# Patient Record
Sex: FEMALE | Race: WHITE | NOT HISPANIC OR LATINO | Employment: OTHER | URBAN - METROPOLITAN AREA
[De-identification: names, ages, dates, MRNs, and addresses within clinical notes are randomized per-mention and may not be internally consistent; named-entity substitution may affect disease eponyms.]

---

## 2023-06-01 ENCOUNTER — HOSPITAL ENCOUNTER (OUTPATIENT)
Dept: DATA CONVERSION | Facility: HOSPITAL | Age: 73
Discharge: STILL ADMITTED OR ADMINISTRATIVE | End: 2023-06-01
Attending: INTERNAL MEDICINE | Admitting: INTERNAL MEDICINE

## 2023-06-01 DIAGNOSIS — I25.119 ATHEROSCLEROTIC HEART DISEASE OF NATIVE CORONARY ARTERY WITH UNSPECIFIED ANGINA PECTORIS (CMS-HCC): ICD-10-CM

## 2023-06-01 DIAGNOSIS — E78.5 HYPERLIPIDEMIA, UNSPECIFIED: ICD-10-CM

## 2023-06-01 DIAGNOSIS — Z79.84 LONG TERM (CURRENT) USE OF ORAL HYPOGLYCEMIC DRUGS: ICD-10-CM

## 2023-06-01 DIAGNOSIS — I11.0 HYPERTENSIVE HEART DISEASE WITH HEART FAILURE (MULTI): ICD-10-CM

## 2023-06-01 DIAGNOSIS — Z90.49 ACQUIRED ABSENCE OF OTHER SPECIFIED PARTS OF DIGESTIVE TRACT: ICD-10-CM

## 2023-06-01 DIAGNOSIS — G47.33 OBSTRUCTIVE SLEEP APNEA (ADULT) (PEDIATRIC): ICD-10-CM

## 2023-06-01 DIAGNOSIS — Z79.85 LONG-TERM (CURRENT) USE OF INJECTABLE NON-INSULIN ANTIDIABETIC DRUGS: ICD-10-CM

## 2023-06-01 DIAGNOSIS — I48.0 PAROXYSMAL ATRIAL FIBRILLATION (MULTI): ICD-10-CM

## 2023-06-01 DIAGNOSIS — I21.4 NON-ST ELEVATION (NSTEMI) MYOCARDIAL INFARCTION (MULTI): Primary | ICD-10-CM

## 2023-06-01 DIAGNOSIS — I50.32 CHRONIC DIASTOLIC (CONGESTIVE) HEART FAILURE (MULTI): ICD-10-CM

## 2023-06-01 DIAGNOSIS — Z90.710 ACQUIRED ABSENCE OF BOTH CERVIX AND UTERUS: ICD-10-CM

## 2023-06-01 DIAGNOSIS — E66.01 MORBID (SEVERE) OBESITY DUE TO EXCESS CALORIES (MULTI): ICD-10-CM

## 2023-06-01 DIAGNOSIS — E11.9 TYPE 2 DIABETES MELLITUS WITHOUT COMPLICATIONS (MULTI): ICD-10-CM

## 2023-06-01 LAB
ANION GAP SERPL CALCULATED.3IONS-SCNC: 14 MMOL/L (ref 0–19)
ANTICOAGULANT: ABNORMAL
ANTICOAGULANT: NORMAL
APTT PPP: 20 SEC (ref 22–32.5)
BASOPHILS # BLD AUTO: 0.06 K/UL (ref 0–0.22)
BASOPHILS NFR BLD AUTO: 0.6 % (ref 0–1)
BUN SERPL-MCNC: 19 MG/DL (ref 8–25)
BUN/CREAT SERPL: 21.1 RATIO (ref 8–21)
CALCIUM SERPL-MCNC: 8.7 MG/DL (ref 8.5–10.4)
CHLORIDE SERPL-SCNC: 102 MMOL/L (ref 97–107)
CO2 SERPL-SCNC: 22 MMOL/L (ref 24–31)
CREAT SERPL-MCNC: 0.9 MG/DL (ref 0.4–1.6)
DEPRECATED RDW RBC AUTO: 41.3 FL (ref 37–54)
DIFFERENTIAL METHOD BLD: ABNORMAL
EOSINOPHIL # BLD AUTO: 0.03 K/UL (ref 0–0.45)
EOSINOPHIL NFR BLD: 0.3 % (ref 0–3)
ERYTHROCYTE [DISTWIDTH] IN BLOOD BY AUTOMATED COUNT: 11.8 % (ref 11.7–15)
GFR SERPL CREATININE-BSD FRML MDRD: 68 ML/MIN/1.73 M2
GLUCOSE SERPL-MCNC: 135 MG/DL (ref 65–99)
HCT VFR BLD AUTO: 42.9 % (ref 36–44)
HGB BLD-MCNC: 14.7 GM/DL (ref 12–15)
HS TROPONIN T DELTA: 21 (ref 0–4)
HS TROPONIN T DELTA: ABNORMAL (ref 0–4)
IMM GRANULOCYTES # BLD AUTO: 0.03 K/UL (ref 0–0.1)
INR PPP: 1 (ref 0.86–1.16)
LYMPHOCYTES # BLD AUTO: 1.76 K/UL (ref 1.2–3.2)
LYMPHOCYTES NFR BLD MANUAL: 16.8 % (ref 20–40)
MCH RBC QN AUTO: 32.6 PG (ref 26–34)
MCHC RBC AUTO-ENTMCNC: 34.3 % (ref 31–37)
MCV RBC AUTO: 95.1 FL (ref 80–100)
MONOCYTES # BLD AUTO: 1.12 K/UL (ref 0–0.8)
MONOCYTES NFR BLD MANUAL: 10.7 % (ref 0–8)
NEUTROPHILS # BLD AUTO: 7.47 K/UL
NEUTROPHILS # BLD AUTO: 7.47 K/UL (ref 1.8–7.7)
NEUTROPHILS.IMMATURE NFR BLD: 0.3 % (ref 0–1)
NEUTS SEG NFR BLD: 71.3 % (ref 50–70)
NRBC BLD-RTO: 0 /100 WBC
NT-PROBNP SERPL-MCNC: 2368 PG/ML (ref 0–353)
PLATELET # BLD AUTO: 319 K/UL (ref 150–450)
PMV BLD AUTO: 10.3 CU (ref 7–12.6)
POTASSIUM SERPL-SCNC: 4.1 MMOL/L (ref 3.4–5.1)
PROTHROMBIN TIME: 10.5 SEC (ref 9.3–12.7)
RBC # BLD AUTO: 4.51 M/UL (ref 4–4.9)
SODIUM SERPL-SCNC: 138 MMOL/L (ref 133–145)
TROPONIN T SERPL-MCNC: 865 NG/L
TROPONIN T SERPL-MCNC: 886 NG/L
WBC # BLD AUTO: 10.5 K/UL (ref 4.5–11)

## 2023-06-02 LAB
ALBUMIN SERPL-MCNC: 3.4 GM/DL (ref 3.5–5)
ALBUMIN/GLOB SERPL: 1.1 RATIO (ref 1.5–3)
ALP BLD-CCNC: 69 U/L (ref 35–125)
ALT SERPL-CCNC: 16 U/L (ref 5–40)
ANION GAP SERPL CALCULATED.3IONS-SCNC: 14 MMOL/L (ref 0–19)
ANION GAP SERPL CALCULATED.3IONS-SCNC: 16 MMOL/L (ref 0–19)
ANTICOAGULANT: ABNORMAL
APPEARANCE PLAS: CLEAR
APTT PPP: 139 SEC (ref 22–32.5)
APTT PPP: 36.8 SEC (ref 22–32.5)
APTT PPP: 44.1 SEC (ref 22–32.5)
AST SERPL-CCNC: 14 U/L (ref 5–40)
BASOPHILS # BLD AUTO: 0.04 K/UL (ref 0–0.22)
BASOPHILS NFR BLD AUTO: 0.4 % (ref 0–1)
BILIRUB SERPL-MCNC: 0.5 MG/DL (ref 0.1–1.2)
BUN SERPL-MCNC: 13 MG/DL (ref 8–25)
BUN SERPL-MCNC: 18 MG/DL (ref 8–25)
BUN/CREAT SERPL: 21.7 RATIO (ref 8–21)
BUN/CREAT SERPL: 25.7 RATIO (ref 8–21)
CALCIUM SERPL-MCNC: 8.6 MG/DL (ref 8.5–10.4)
CALCIUM SERPL-MCNC: 8.8 MG/DL (ref 8.5–10.4)
CHLORIDE SERPL-SCNC: 100 MMOL/L (ref 97–107)
CHLORIDE SERPL-SCNC: 103 MMOL/L (ref 97–107)
CHOLEST SERPL-MCNC: 161 MG/DL (ref 133–200)
CHOLEST/HDLC SERPL: 3.6 RATIO
CO2 SERPL-SCNC: 19 MMOL/L (ref 24–31)
CO2 SERPL-SCNC: 22 MMOL/L (ref 24–31)
COLOR SPUN FLD: YELLOW
CREAT SERPL-MCNC: 0.6 MG/DL (ref 0.4–1.6)
CREAT SERPL-MCNC: 0.7 MG/DL (ref 0.4–1.6)
DEPRECATED RDW RBC AUTO: 41.1 FL (ref 37–54)
DEPRECATED RDW RBC AUTO: 41.3 FL (ref 37–54)
DIFFERENTIAL METHOD BLD: ABNORMAL
EOSINOPHIL # BLD AUTO: 0.05 K/UL (ref 0–0.45)
EOSINOPHIL NFR BLD: 0.5 % (ref 0–3)
ERYTHROCYTE [DISTWIDTH] IN BLOOD BY AUTOMATED COUNT: 11.9 % (ref 11.7–15)
ERYTHROCYTE [DISTWIDTH] IN BLOOD BY AUTOMATED COUNT: 11.9 % (ref 11.7–15)
FASTING STATUS PATIENT QL REPORTED: ABNORMAL
GFR SERPL CREATININE-BSD FRML MDRD: 92 ML/MIN/1.73 M2
GFR SERPL CREATININE-BSD FRML MDRD: 95 ML/MIN/1.73 M2
GLOBULIN SER-MCNC: 3 G/DL (ref 1.9–3.7)
GLUCOSE BLD STRIP.AUTO-MCNC: 132 MG/DL (ref 65–99)
GLUCOSE BLD STRIP.AUTO-MCNC: 140 MG/DL (ref 65–99)
GLUCOSE BLD STRIP.AUTO-MCNC: 145 MG/DL (ref 65–99)
GLUCOSE BLD STRIP.AUTO-MCNC: 154 MG/DL (ref 65–99)
GLUCOSE SERPL-MCNC: 112 MG/DL (ref 65–99)
GLUCOSE SERPL-MCNC: 144 MG/DL (ref 65–99)
HBA1C MFR BLD: 5.9 % (ref 4–6)
HCT VFR BLD AUTO: 38 % (ref 36–44)
HCT VFR BLD AUTO: 41.3 % (ref 36–44)
HDLC SERPL-MCNC: 45 MG/DL
HGB BLD-MCNC: 13 GM/DL (ref 12–15)
HGB BLD-MCNC: 14.1 GM/DL (ref 12–15)
HS TROPONIN T DELTA: 92 (ref 0–4)
IMM GRANULOCYTES # BLD AUTO: 0.03 K/UL (ref 0–0.1)
LDLC SERPL CALC-MCNC: 82 MG/DL (ref 65–130)
LYMPHOCYTES # BLD AUTO: 2.15 K/UL (ref 1.2–3.2)
LYMPHOCYTES NFR BLD MANUAL: 23.5 % (ref 20–40)
MCH RBC QN AUTO: 32.3 PG (ref 26–34)
MCH RBC QN AUTO: 32.8 PG (ref 26–34)
MCHC RBC AUTO-ENTMCNC: 34.1 % (ref 31–37)
MCHC RBC AUTO-ENTMCNC: 34.2 % (ref 31–37)
MCV RBC AUTO: 94.3 FL (ref 80–100)
MCV RBC AUTO: 96 FL (ref 80–100)
MONOCYTES # BLD AUTO: 0.95 K/UL (ref 0–0.8)
MONOCYTES NFR BLD MANUAL: 10.4 % (ref 0–8)
NEUTROPHILS # BLD AUTO: 5.92 K/UL
NEUTROPHILS # BLD AUTO: 5.92 K/UL (ref 1.8–7.7)
NEUTROPHILS.IMMATURE NFR BLD: 0.3 % (ref 0–1)
NEUTS SEG NFR BLD: 64.9 % (ref 50–70)
NRBC BLD-RTO: 0 /100 WBC
NRBC BLD-RTO: 0 /100 WBC
PLATELET # BLD AUTO: 300 K/UL (ref 150–450)
PLATELET # BLD AUTO: 311 K/UL (ref 150–450)
PMV BLD AUTO: 10.3 CU (ref 7–12.6)
PMV BLD AUTO: 9.6 CU (ref 7–12.6)
POTASSIUM SERPL-SCNC: 3.9 MMOL/L (ref 3.4–5.1)
POTASSIUM SERPL-SCNC: 4 MMOL/L (ref 3.4–5.1)
PROT SERPL-MCNC: 6.4 G/DL (ref 5.9–7.9)
RBC # BLD AUTO: 4.03 M/UL (ref 4–4.9)
RBC # BLD AUTO: 4.3 M/UL (ref 4–4.9)
SODIUM SERPL-SCNC: 135 MMOL/L (ref 133–145)
SODIUM SERPL-SCNC: 139 MMOL/L (ref 133–145)
TRIGL SERPL-MCNC: 171 MG/DL (ref 40–150)
TROPONIN T SERPL-MCNC: 773 NG/L
WBC # BLD AUTO: 8.8 K/UL (ref 4.5–11)
WBC # BLD AUTO: 9.1 K/UL (ref 4.5–11)

## 2023-06-03 LAB
ANION GAP SERPL CALCULATED.3IONS-SCNC: 14 MMOL/L (ref 0–19)
ANTICOAGULANT: NORMAL
APPEARANCE PLAS: CLEAR
APTT PPP: 23.9 SEC (ref 22–32.5)
BASOPHILS # BLD AUTO: 0.03 K/UL (ref 0–0.22)
BASOPHILS NFR BLD AUTO: 0.3 % (ref 0–1)
BUN SERPL-MCNC: 15 MG/DL (ref 8–25)
BUN/CREAT SERPL: 21.4 RATIO (ref 8–21)
CALCIUM SERPL-MCNC: 8.7 MG/DL (ref 8.5–10.4)
CHLORIDE SERPL-SCNC: 102 MMOL/L (ref 97–107)
CHOLEST SERPL-MCNC: 125 MG/DL (ref 133–200)
CHOLEST/HDLC SERPL: 3.1 RATIO
CO2 SERPL-SCNC: 21 MMOL/L (ref 24–31)
COLOR SPUN FLD: YELLOW
CREAT SERPL-MCNC: 0.7 MG/DL (ref 0.4–1.6)
DEPRECATED RDW RBC AUTO: 40.8 FL (ref 37–54)
DIFFERENTIAL METHOD BLD: ABNORMAL
EOSINOPHIL # BLD AUTO: 0.11 K/UL (ref 0–0.45)
EOSINOPHIL NFR BLD: 1.2 % (ref 0–3)
ERYTHROCYTE [DISTWIDTH] IN BLOOD BY AUTOMATED COUNT: 11.8 % (ref 11.7–15)
FASTING STATUS PATIENT QL REPORTED: ABNORMAL
GFR SERPL CREATININE-BSD FRML MDRD: 92 ML/MIN/1.73 M2
GLUCOSE BLD STRIP.AUTO-MCNC: 149 MG/DL (ref 65–99)
GLUCOSE BLD STRIP.AUTO-MCNC: 163 MG/DL (ref 65–99)
GLUCOSE BLD STRIP.AUTO-MCNC: 322 MG/DL (ref 65–99)
GLUCOSE BLD STRIP.AUTO-MCNC: 79 MG/DL (ref 65–99)
GLUCOSE SERPL-MCNC: 181 MG/DL (ref 65–99)
HCT VFR BLD AUTO: 40.2 % (ref 36–44)
HDLC SERPL-MCNC: 40 MG/DL
HGB BLD-MCNC: 14.1 GM/DL (ref 12–15)
IMM GRANULOCYTES # BLD AUTO: 0.04 K/UL (ref 0–0.1)
LDLC SERPL CALC-MCNC: 55 MG/DL (ref 65–130)
LYMPHOCYTES # BLD AUTO: 1.65 K/UL (ref 1.2–3.2)
LYMPHOCYTES NFR BLD MANUAL: 18.7 % (ref 20–40)
MCH RBC QN AUTO: 33.5 PG (ref 26–34)
MCHC RBC AUTO-ENTMCNC: 35.1 % (ref 31–37)
MCV RBC AUTO: 95.5 FL (ref 80–100)
MONOCYTES # BLD AUTO: 0.8 K/UL (ref 0–0.8)
MONOCYTES NFR BLD MANUAL: 9.1 % (ref 0–8)
NEUTROPHILS # BLD AUTO: 6.18 K/UL
NEUTROPHILS # BLD AUTO: 6.18 K/UL (ref 1.8–7.7)
NEUTROPHILS.IMMATURE NFR BLD: 0.5 % (ref 0–1)
NEUTS SEG NFR BLD: 70.2 % (ref 50–70)
NRBC BLD-RTO: 0 /100 WBC
PLATELET # BLD AUTO: 314 K/UL (ref 150–450)
PMV BLD AUTO: 9.9 CU (ref 7–12.6)
POTASSIUM SERPL-SCNC: 3.8 MMOL/L (ref 3.4–5.1)
RBC # BLD AUTO: 4.21 M/UL (ref 4–4.9)
SODIUM SERPL-SCNC: 137 MMOL/L (ref 133–145)
TRIGL SERPL-MCNC: 149 MG/DL (ref 40–150)
WBC # BLD AUTO: 8.8 K/UL (ref 4.5–11)

## 2023-06-04 LAB
ANION GAP SERPL CALCULATED.3IONS-SCNC: 12 MMOL/L (ref 0–19)
BUN SERPL-MCNC: 17 MG/DL (ref 8–25)
BUN/CREAT SERPL: 24.3 RATIO (ref 8–21)
CALCIUM SERPL-MCNC: 8.7 MG/DL (ref 8.5–10.4)
CHLORIDE SERPL-SCNC: 105 MMOL/L (ref 97–107)
CO2 SERPL-SCNC: 22 MMOL/L (ref 24–31)
CREAT SERPL-MCNC: 0.7 MG/DL (ref 0.4–1.6)
DEPRECATED RDW RBC AUTO: 40.5 FL (ref 37–54)
ERYTHROCYTE [DISTWIDTH] IN BLOOD BY AUTOMATED COUNT: 11.7 % (ref 11.7–15)
GFR SERPL CREATININE-BSD FRML MDRD: 92 ML/MIN/1.73 M2
GLUCOSE BLD STRIP.AUTO-MCNC: 171 MG/DL (ref 65–99)
GLUCOSE SERPL-MCNC: 163 MG/DL (ref 65–99)
HCT VFR BLD AUTO: 39.6 % (ref 36–44)
HGB BLD-MCNC: 13.4 GM/DL (ref 12–15)
MCH RBC QN AUTO: 32.5 PG (ref 26–34)
MCHC RBC AUTO-ENTMCNC: 33.8 % (ref 31–37)
MCV RBC AUTO: 96.1 FL (ref 80–100)
NRBC BLD-RTO: 0 /100 WBC
PLATELET # BLD AUTO: 288 K/UL (ref 150–450)
PMV BLD AUTO: 10.1 CU (ref 7–12.6)
POTASSIUM SERPL-SCNC: 4.2 MMOL/L (ref 3.4–5.1)
RBC # BLD AUTO: 4.12 M/UL (ref 4–4.9)
SODIUM SERPL-SCNC: 139 MMOL/L (ref 133–145)
WBC # BLD AUTO: 10.9 K/UL (ref 4.5–11)

## 2023-06-13 LAB — TSI ACT/NOR SER: NORMAL

## 2023-09-06 ENCOUNTER — HOSPITAL ENCOUNTER (OUTPATIENT)
Dept: DATA CONVERSION | Facility: HOSPITAL | Age: 73
Discharge: HOME | End: 2023-09-06
Payer: COMMERCIAL

## 2023-09-06 DIAGNOSIS — E11.49 TYPE 2 DIABETES MELLITUS WITH OTHER DIABETIC NEUROLOGICAL COMPLICATION (MULTI): ICD-10-CM

## 2023-09-06 DIAGNOSIS — E11.40 TYPE 2 DIABETES MELLITUS WITH DIABETIC NEUROPATHY, UNSPECIFIED (MULTI): ICD-10-CM

## 2023-09-06 LAB
ALBUMIN SERPL-MCNC: 3.7 GM/DL (ref 3.5–5)
ALBUMIN/GLOB SERPL: 1.4 RATIO (ref 1.5–3)
ALP BLD-CCNC: 65 U/L (ref 35–125)
ALT SERPL-CCNC: 18 U/L (ref 5–40)
ANION GAP SERPL CALCULATED.3IONS-SCNC: 14 MMOL/L (ref 0–19)
AST SERPL-CCNC: 14 U/L (ref 5–40)
BACTERIA UR QL AUTO: POSITIVE
BILIRUB SERPL-MCNC: 0.5 MG/DL (ref 0.1–1.2)
BILIRUB UR QL STRIP.AUTO: NEGATIVE
BUN SERPL-MCNC: 13 MG/DL (ref 8–25)
BUN/CREAT SERPL: 18.6 RATIO (ref 8–21)
CALCIUM SERPL-MCNC: 9.1 MG/DL (ref 8.5–10.4)
CHLORIDE SERPL-SCNC: 103 MMOL/L (ref 97–107)
CLARITY UR: ABNORMAL
CO2 SERPL-SCNC: 23 MMOL/L (ref 24–31)
COLOR UR: COLORLESS
CREAT SERPL-MCNC: 0.7 MG/DL (ref 0.4–1.6)
GFR SERPL CREATININE-BSD FRML MDRD: 92 ML/MIN/1.73 M2
GLOBULIN SER-MCNC: 2.6 G/DL (ref 1.9–3.7)
GLUCOSE SERPL-MCNC: 216 MG/DL (ref 65–99)
GLUCOSE UR STRIP.AUTO-MCNC: NEGATIVE MG/DL
HBA1C MFR BLD: 7.1 % (ref 4–6)
HGB UR QL STRIP.AUTO: 6 /HPF (ref 0–3)
HGB UR QL: ABNORMAL
HYALINE CASTS UR QL AUTO: ABNORMAL /LPF
KETONES UR QL STRIP.AUTO: NEGATIVE
LEUKOCYTE ESTERASE UR QL STRIP.AUTO: ABNORMAL
NITRITE UR QL STRIP.AUTO: POSITIVE
PH UR STRIP.AUTO: 5.5 [PH] (ref 4.6–8)
POTASSIUM SERPL-SCNC: 4.1 MMOL/L (ref 3.4–5.1)
PROT SERPL-MCNC: 6.3 G/DL (ref 5.9–7.9)
PROT UR STRIP.AUTO-MCNC: NEGATIVE MG/DL
REFLEX MICROSCOPIC (UA): ABNORMAL
SODIUM SERPL-SCNC: 140 MMOL/L (ref 133–145)
SP GR UR STRIP.AUTO: 1.01 (ref 1–1.03)
SQUAMOUS UR QL AUTO: ABNORMAL /HPF
UNSPECIFIED CRY UR QL COMP ASSIST: ABNORMAL
UROBILINOGEN UR QL STRIP.AUTO: NORMAL MG/DL (ref 0–1)
WBC #/AREA URNS AUTO: 8 /HPF (ref 0–3)

## 2023-09-07 ENCOUNTER — HOSPITAL ENCOUNTER (OUTPATIENT)
Dept: DATA CONVERSION | Facility: HOSPITAL | Age: 73
Discharge: HOME | End: 2023-09-07
Payer: COMMERCIAL

## 2023-09-07 DIAGNOSIS — R53.83 OTHER FATIGUE: ICD-10-CM

## 2023-09-07 DIAGNOSIS — Z79.01 LONG TERM (CURRENT) USE OF ANTICOAGULANTS: ICD-10-CM

## 2023-09-07 DIAGNOSIS — E11.65 TYPE 2 DIABETES MELLITUS WITH HYPERGLYCEMIA (MULTI): ICD-10-CM

## 2023-09-07 DIAGNOSIS — Z79.899 OTHER LONG TERM (CURRENT) DRUG THERAPY: ICD-10-CM

## 2023-09-07 DIAGNOSIS — R00.0 TACHYCARDIA, UNSPECIFIED: ICD-10-CM

## 2023-09-07 DIAGNOSIS — Z91.148 PATIENT'S OTHER NONCOMPLIANCE WITH MEDICATION REGIMEN FOR OTHER REASON: ICD-10-CM

## 2023-09-07 DIAGNOSIS — I48.91 UNSPECIFIED ATRIAL FIBRILLATION (MULTI): ICD-10-CM

## 2023-09-07 LAB
ANION GAP SERPL CALCULATED.3IONS-SCNC: 15 MMOL/L (ref 0–19)
BASOPHILS # BLD AUTO: 0.04 K/UL (ref 0–0.22)
BASOPHILS NFR BLD AUTO: 0.5 % (ref 0–1)
BUN SERPL-MCNC: 11 MG/DL (ref 8–25)
BUN/CREAT SERPL: 18.3 RATIO (ref 8–21)
CALCIUM SERPL-MCNC: 9 MG/DL (ref 8.5–10.4)
CHLORIDE SERPL-SCNC: 102 MMOL/L (ref 97–107)
CO2 SERPL-SCNC: 21 MMOL/L (ref 24–31)
CREAT SERPL-MCNC: 0.6 MG/DL (ref 0.4–1.6)
DEPRECATED RDW RBC AUTO: 43.7 FL (ref 37–54)
DIFFERENTIAL METHOD BLD: NORMAL
EOSINOPHIL # BLD AUTO: 0.08 K/UL (ref 0–0.45)
EOSINOPHIL NFR BLD: 1 % (ref 0–3)
ERYTHROCYTE [DISTWIDTH] IN BLOOD BY AUTOMATED COUNT: 12.9 % (ref 11.7–15)
GFR SERPL CREATININE-BSD FRML MDRD: 95 ML/MIN/1.73 M2
GLUCOSE SERPL-MCNC: 335 MG/DL (ref 65–99)
HCT VFR BLD AUTO: 41.8 % (ref 36–44)
HGB BLD-MCNC: 14.3 GM/DL (ref 12–15)
HS TROPONIN T DELTA: 1 (ref 0–4)
HS TROPONIN T DELTA: NORMAL (ref 0–4)
IMM GRANULOCYTES # BLD AUTO: 0.03 K/UL (ref 0–0.1)
LYMPHOCYTES # BLD AUTO: 1.78 K/UL (ref 1.2–3.2)
LYMPHOCYTES NFR BLD MANUAL: 22.8 % (ref 20–40)
MCH RBC QN AUTO: 31.8 PG (ref 26–34)
MCHC RBC AUTO-ENTMCNC: 34.2 % (ref 31–37)
MCV RBC AUTO: 92.9 FL (ref 80–100)
MONOCYTES # BLD AUTO: 0.62 K/UL (ref 0–0.8)
MONOCYTES NFR BLD MANUAL: 7.9 % (ref 0–8)
NEUTROPHILS # BLD AUTO: 5.27 K/UL
NEUTROPHILS # BLD AUTO: 5.27 K/UL (ref 1.8–7.7)
NEUTROPHILS.IMMATURE NFR BLD: 0.4 % (ref 0–1)
NEUTS SEG NFR BLD: 67.4 % (ref 50–70)
NRBC BLD-RTO: 0 /100 WBC
PLATELET # BLD AUTO: 268 K/UL (ref 150–450)
PMV BLD AUTO: 9.9 CU (ref 7–12.6)
POTASSIUM SERPL-SCNC: 4.1 MMOL/L (ref 3.4–5.1)
RBC # BLD AUTO: 4.5 M/UL (ref 4–4.9)
SODIUM SERPL-SCNC: 138 MMOL/L (ref 133–145)
TROPONIN T SERPL-MCNC: 10 NG/L
TROPONIN T SERPL-MCNC: 11 NG/L
WBC # BLD AUTO: 7.8 K/UL (ref 4.5–11)

## 2023-09-09 PROBLEM — N39.0 UTI (URINARY TRACT INFECTION): Status: ACTIVE | Noted: 2023-07-25

## 2023-09-09 PROBLEM — R53.83 FATIGUE: Status: ACTIVE | Noted: 2023-06-01

## 2023-09-09 PROBLEM — F33.9 RECURRENT MAJOR DEPRESSIVE DISORDER (CMS-HCC): Status: ACTIVE | Noted: 2018-08-04

## 2023-09-09 PROBLEM — K52.9 ACUTE GASTROENTERITIS: Status: ACTIVE | Noted: 2019-04-10

## 2023-09-09 PROBLEM — J31.0 RHINITIS: Status: ACTIVE | Noted: 2020-03-20

## 2023-09-09 PROBLEM — H91.93 BILATERAL HEARING LOSS: Status: ACTIVE | Noted: 2020-11-04

## 2023-09-09 PROBLEM — E11.65 TYPE 2 DIABETES MELLITUS WITH HYPERGLYCEMIA (MULTI): Status: ACTIVE | Noted: 2018-08-02

## 2023-09-09 PROBLEM — N30.01 ACUTE CYSTITIS WITH HEMATURIA: Status: ACTIVE | Noted: 2020-03-11

## 2023-09-09 PROBLEM — E11.40 DIABETIC NEUROPATHY (MULTI): Status: ACTIVE | Noted: 2020-04-16

## 2023-09-09 PROBLEM — K21.9 GASTROESOPHAGEAL REFLUX DISEASE: Status: ACTIVE | Noted: 2019-05-24

## 2023-09-09 PROBLEM — G47.00 INSOMNIA: Status: ACTIVE | Noted: 2023-04-10

## 2023-09-09 PROBLEM — J00 NASOPHARYNGITIS: Status: ACTIVE | Noted: 2020-03-11

## 2023-09-09 PROBLEM — F41.9 ANXIETY: Status: ACTIVE | Noted: 2018-09-28

## 2023-09-09 PROBLEM — D17.0 LIPOMA OF NECK: Status: ACTIVE | Noted: 2023-09-09

## 2023-09-09 PROBLEM — H91.90 HEARING LOSS: Status: ACTIVE | Noted: 2020-11-02

## 2023-09-09 PROBLEM — D17.9 LIPOMA: Status: ACTIVE | Noted: 2019-07-31

## 2023-09-09 PROBLEM — G25.81 RESTLESS LEGS: Status: ACTIVE | Noted: 2019-05-24

## 2023-09-09 PROBLEM — I10 ESSENTIAL (PRIMARY) HYPERTENSION: Status: ACTIVE | Noted: 2018-09-29

## 2023-09-09 PROBLEM — L40.9 SCALP PSORIASIS: Status: ACTIVE | Noted: 2020-11-02

## 2023-09-09 PROBLEM — I20.9 ANGINA PECTORIS (CMS-HCC): Status: ACTIVE | Noted: 2023-09-09

## 2023-09-09 PROBLEM — R05.9 COUGH: Status: ACTIVE | Noted: 2020-03-11

## 2023-09-09 PROBLEM — I20.89 ANGINAL EQUIVALENT (CMS-HCC): Status: ACTIVE | Noted: 2023-09-09

## 2023-09-09 PROBLEM — I25.10 ASHD (ARTERIOSCLEROTIC HEART DISEASE): Status: ACTIVE | Noted: 2023-09-09

## 2023-09-09 PROBLEM — R58 ECCHYMOSIS: Status: ACTIVE | Noted: 2023-07-25

## 2023-09-09 PROBLEM — E66.9 OBESITY: Status: ACTIVE | Noted: 2019-02-25

## 2023-09-09 PROBLEM — Z91.148 NONCOMPLIANCE WITH MEDICATION REGIMEN: Status: ACTIVE | Noted: 2020-03-11

## 2023-09-09 PROBLEM — R11.2 NAUSEA & VOMITING: Status: ACTIVE | Noted: 2019-04-10

## 2023-09-09 PROBLEM — R07.9 CHEST PAIN: Status: ACTIVE | Noted: 2023-06-01

## 2023-09-09 PROBLEM — B37.31 YEAST VAGINITIS: Status: ACTIVE | Noted: 2022-12-23

## 2023-09-09 PROBLEM — R06.02 SHORTNESS OF BREATH ON EXERTION: Status: ACTIVE | Noted: 2023-06-01

## 2023-09-09 PROBLEM — R82.90 ABNORMAL FINDING IN URINE: Status: ACTIVE | Noted: 2020-08-11

## 2023-09-09 PROBLEM — I25.10 CAD (CORONARY ARTERY DISEASE): Status: ACTIVE | Noted: 2023-06-30

## 2023-09-09 PROBLEM — H69.90 EUSTACHIAN TUBE DYSFUNCTION: Status: ACTIVE | Noted: 2020-03-20

## 2023-09-09 PROBLEM — E11.9 TYPE 2 DIABETES MELLITUS WITHOUT COMPLICATIONS (MULTI): Status: ACTIVE | Noted: 2018-09-29

## 2023-09-09 PROBLEM — K58.9 IBS (IRRITABLE BOWEL SYNDROME): Status: ACTIVE | Noted: 2022-12-23

## 2023-09-09 PROBLEM — I48.91 ATRIAL FIBRILLATION WITH RVR (MULTI): Status: ACTIVE | Noted: 2023-06-14

## 2023-09-09 PROBLEM — R31.9 HEMATURIA: Status: ACTIVE | Noted: 2020-05-11

## 2023-09-09 RX ORDER — ROSUVASTATIN CALCIUM 20 MG/1
20 TABLET, COATED ORAL DAILY
COMMUNITY
Start: 2023-06-06 | End: 2024-01-03 | Stop reason: WASHOUT

## 2023-09-09 RX ORDER — LISINOPRIL 20 MG/1
20 TABLET ORAL DAILY
COMMUNITY
Start: 2023-03-16 | End: 2024-03-14

## 2023-09-09 RX ORDER — METFORMIN HYDROCHLORIDE 1000 MG/1
1000 TABLET ORAL 2 TIMES DAILY
COMMUNITY
Start: 2022-12-23 | End: 2023-11-30

## 2023-09-09 RX ORDER — APIXABAN 5 MG/1
5 TABLET, FILM COATED ORAL 2 TIMES DAILY
COMMUNITY
Start: 2023-06-15 | End: 2024-01-03 | Stop reason: WASHOUT

## 2023-09-09 RX ORDER — PIOGLITAZONEHYDROCHLORIDE 30 MG/1
TABLET ORAL
COMMUNITY
Start: 2023-06-15 | End: 2024-01-03 | Stop reason: WASHOUT

## 2023-09-09 RX ORDER — CLOPIDOGREL BISULFATE 75 MG/1
75 TABLET ORAL DAILY
COMMUNITY
Start: 2023-06-15

## 2023-09-09 RX ORDER — TRAZODONE HYDROCHLORIDE 50 MG/1
0.5 TABLET ORAL NIGHTLY
COMMUNITY
Start: 2023-06-08 | End: 2024-01-03 | Stop reason: WASHOUT

## 2023-09-09 RX ORDER — DULAGLUTIDE 0.75 MG/.5ML
INJECTION, SOLUTION SUBCUTANEOUS
COMMUNITY
Start: 2023-02-27 | End: 2023-10-04 | Stop reason: SDUPTHER

## 2023-09-09 RX ORDER — HYDROXYZINE HYDROCHLORIDE 25 MG/1
25 TABLET, FILM COATED ORAL
COMMUNITY
Start: 2022-09-21 | End: 2024-01-03 | Stop reason: WASHOUT

## 2023-09-09 RX ORDER — METFORMIN HYDROCHLORIDE 500 MG/1
3 TABLET ORAL EVERY 24 HOURS
COMMUNITY
End: 2024-04-03 | Stop reason: ALTCHOICE

## 2023-09-09 RX ORDER — ROSUVASTATIN CALCIUM 10 MG/1
10 TABLET, COATED ORAL NIGHTLY
COMMUNITY
Start: 2023-06-04

## 2023-09-09 RX ORDER — FLUCONAZOLE 150 MG/1
150 TABLET ORAL ONCE
COMMUNITY
Start: 2022-12-23 | End: 2024-01-03 | Stop reason: WASHOUT

## 2023-09-09 RX ORDER — METOPROLOL TARTRATE 25 MG/1
TABLET, FILM COATED ORAL
COMMUNITY
Start: 2023-06-27

## 2023-09-09 RX ORDER — EXENATIDE 2 MG/.85ML
INJECTION, SUSPENSION, EXTENDED RELEASE SUBCUTANEOUS
COMMUNITY
End: 2024-01-03 | Stop reason: WASHOUT

## 2023-09-09 RX ORDER — LANOLIN ALCOHOL/MO/W.PET/CERES
1000 CREAM (GRAM) TOPICAL DAILY
COMMUNITY
End: 2024-01-03 | Stop reason: WASHOUT

## 2023-09-09 RX ORDER — OMEPRAZOLE 40 MG/1
40 CAPSULE, DELAYED RELEASE ORAL DAILY
COMMUNITY
Start: 2022-04-27 | End: 2024-01-03 | Stop reason: WASHOUT

## 2023-09-09 RX ORDER — BLOOD-GLUCOSE METER
EACH MISCELLANEOUS
COMMUNITY
Start: 2023-08-05 | End: 2024-02-01

## 2023-09-09 RX ORDER — DULAGLUTIDE 1.5 MG/.5ML
1.5 INJECTION, SOLUTION SUBCUTANEOUS
COMMUNITY
End: 2023-10-04 | Stop reason: DRUGHIGH

## 2023-09-09 RX ORDER — DULOXETIN HYDROCHLORIDE 60 MG/1
1 CAPSULE, DELAYED RELEASE ORAL DAILY
COMMUNITY
Start: 2023-03-06 | End: 2024-02-28

## 2023-09-09 RX ORDER — GLIMEPIRIDE 2 MG/1
4 TABLET ORAL 2 TIMES DAILY
COMMUNITY
Start: 2022-12-23 | End: 2024-01-03 | Stop reason: WASHOUT

## 2023-09-09 RX ORDER — DIAZEPAM 5 MG/1
TABLET ORAL
COMMUNITY
Start: 2022-09-19 | End: 2024-01-03 | Stop reason: WASHOUT

## 2023-09-09 RX ORDER — NITROGLYCERIN 0.4 MG/1
TABLET SUBLINGUAL
COMMUNITY
Start: 2023-06-15

## 2023-09-25 DIAGNOSIS — Z95.5 STATUS POST PRIMARY ANGIOPLASTY WITH CORONARY STENT: Primary | ICD-10-CM

## 2023-09-26 DIAGNOSIS — Z95.5 STENTED CORONARY ARTERY: ICD-10-CM

## 2023-09-26 DIAGNOSIS — I21.4 NON-ST ELEVATION MYOCARDIAL INFARCTION (NSTEMI) (MULTI): ICD-10-CM

## 2023-09-28 ENCOUNTER — HOSPITAL ENCOUNTER (OUTPATIENT)
Dept: DATA CONVERSION | Facility: HOSPITAL | Age: 73
Discharge: HOME | End: 2023-09-28
Payer: COMMERCIAL

## 2023-10-03 ENCOUNTER — CLINICAL SUPPORT (OUTPATIENT)
Dept: CARDIAC REHAB | Facility: CLINIC | Age: 73
End: 2023-10-03
Payer: COMMERCIAL

## 2023-10-03 ENCOUNTER — APPOINTMENT (OUTPATIENT)
Dept: CARDIAC REHAB | Facility: CLINIC | Age: 73
End: 2023-10-03
Payer: COMMERCIAL

## 2023-10-03 DIAGNOSIS — Z95.5 STENTED CORONARY ARTERY: ICD-10-CM

## 2023-10-03 DIAGNOSIS — I21.4 NON-ST ELEVATION MYOCARDIAL INFARCTION (NSTEMI) (MULTI): ICD-10-CM

## 2023-10-03 PROCEDURE — 93798 PHYS/QHP OP CAR RHAB W/ECG: CPT | Performed by: INTERNAL MEDICINE

## 2023-10-04 ENCOUNTER — OFFICE VISIT (OUTPATIENT)
Dept: PRIMARY CARE | Facility: CLINIC | Age: 73
End: 2023-10-04
Payer: COMMERCIAL

## 2023-10-04 ENCOUNTER — APPOINTMENT (OUTPATIENT)
Dept: PRIMARY CARE | Facility: CLINIC | Age: 73
End: 2023-10-04
Payer: COMMERCIAL

## 2023-10-04 ENCOUNTER — TRANSCRIBE ORDERS (OUTPATIENT)
Dept: CARDIAC REHAB | Facility: CLINIC | Age: 73
End: 2023-10-04

## 2023-10-04 VITALS
SYSTOLIC BLOOD PRESSURE: 126 MMHG | OXYGEN SATURATION: 98 % | DIASTOLIC BLOOD PRESSURE: 74 MMHG | HEART RATE: 81 BPM | BODY MASS INDEX: 34.48 KG/M2 | WEIGHT: 207.2 LBS

## 2023-10-04 DIAGNOSIS — E11.9 TYPE 2 DIABETES MELLITUS WITHOUT COMPLICATION, WITHOUT LONG-TERM CURRENT USE OF INSULIN (MULTI): Primary | ICD-10-CM

## 2023-10-04 DIAGNOSIS — I21.4 NON-ST ELEVATION MYOCARDIAL INFARCTION (NSTEMI) (MULTI): Primary | ICD-10-CM

## 2023-10-04 DIAGNOSIS — Z95.5 STENTED CORONARY ARTERY: ICD-10-CM

## 2023-10-04 DIAGNOSIS — R39.9 URINARY TRACT INFECTION SYMPTOMS: ICD-10-CM

## 2023-10-04 PROCEDURE — 1159F MED LIST DOCD IN RCRD: CPT | Performed by: STUDENT IN AN ORGANIZED HEALTH CARE EDUCATION/TRAINING PROGRAM

## 2023-10-04 PROCEDURE — 3078F DIAST BP <80 MM HG: CPT | Performed by: STUDENT IN AN ORGANIZED HEALTH CARE EDUCATION/TRAINING PROGRAM

## 2023-10-04 PROCEDURE — 4010F ACE/ARB THERAPY RXD/TAKEN: CPT | Performed by: STUDENT IN AN ORGANIZED HEALTH CARE EDUCATION/TRAINING PROGRAM

## 2023-10-04 PROCEDURE — 99213 OFFICE O/P EST LOW 20 MIN: CPT | Performed by: STUDENT IN AN ORGANIZED HEALTH CARE EDUCATION/TRAINING PROGRAM

## 2023-10-04 PROCEDURE — 3008F BODY MASS INDEX DOCD: CPT | Performed by: STUDENT IN AN ORGANIZED HEALTH CARE EDUCATION/TRAINING PROGRAM

## 2023-10-04 PROCEDURE — 1160F RVW MEDS BY RX/DR IN RCRD: CPT | Performed by: STUDENT IN AN ORGANIZED HEALTH CARE EDUCATION/TRAINING PROGRAM

## 2023-10-04 PROCEDURE — 3051F HG A1C>EQUAL 7.0%<8.0%: CPT | Performed by: STUDENT IN AN ORGANIZED HEALTH CARE EDUCATION/TRAINING PROGRAM

## 2023-10-04 PROCEDURE — 1126F AMNT PAIN NOTED NONE PRSNT: CPT | Performed by: STUDENT IN AN ORGANIZED HEALTH CARE EDUCATION/TRAINING PROGRAM

## 2023-10-04 PROCEDURE — 1036F TOBACCO NON-USER: CPT | Performed by: STUDENT IN AN ORGANIZED HEALTH CARE EDUCATION/TRAINING PROGRAM

## 2023-10-04 PROCEDURE — 3074F SYST BP LT 130 MM HG: CPT | Performed by: STUDENT IN AN ORGANIZED HEALTH CARE EDUCATION/TRAINING PROGRAM

## 2023-10-04 RX ORDER — DULAGLUTIDE 0.75 MG/.5ML
INJECTION, SOLUTION SUBCUTANEOUS
Qty: 2 ML | Refills: 0 | Status: SHIPPED | OUTPATIENT
Start: 2023-10-04 | End: 2023-11-07 | Stop reason: DRUGHIGH

## 2023-10-04 ASSESSMENT — PAIN SCALES - GENERAL: PAINLEVEL: 0-NO PAIN

## 2023-10-04 ASSESSMENT — PATIENT HEALTH QUESTIONNAIRE - PHQ9
1. LITTLE INTEREST OR PLEASURE IN DOING THINGS: NOT AT ALL
SUM OF ALL RESPONSES TO PHQ9 QUESTIONS 1 AND 2: 0
2. FEELING DOWN, DEPRESSED OR HOPELESS: NOT AT ALL

## 2023-10-04 NOTE — PATIENT INSTRUCTIONS
Refill sent for Trulicity 0.75mg. When you take your third dose contact the office so I can send for the increased dose of 1.5mg  Reach out to me or Sybil with any issues getting filled  Follow up diabetic check 3 months

## 2023-10-04 NOTE — PROGRESS NOTES
"Subjective   Kristi Jackson is a 72 y.o. female who presents for Diabetes Follow up    HPI   Tolerating current medication regimen yes  Current medications  -Trulicity -STOPPED d/t issues getting refilled at pharmacy. Says she called office about it but never got response.  -Glimepiride 4mg BID  -Metformin 1000mg BID  -Pioglitazone 30mg  Blood sugars at home averaging 157-212  Hypoglycemic episodes no  Last A1C : 7.1, prior 5.9  Up to date on yearly eye exams yes  Follows with podiatry no    Currently in cardiac rehab. Following with Dr. Rodriguez    LAB REVIEW   Glucose (MG/DL)   Date Value   09/07/2023 335 (H)   09/06/2023 216 (H)   06/30/2023 129 (H)     Hemoglobin A1C (%)   Date Value   09/06/2023 7.1 (H)   06/29/2023 5.9   06/02/2023 5.9       Creatinine (MG/DL)   Date Value   09/07/2023 0.6   09/06/2023 0.7   06/30/2023 0.7     Lab Results   Component Value Date    ALBUR 12 06/29/2023    LDLCALC 55 (L) 06/03/2023    CREATININE 0.6 09/07/2023     Lab Results   Component Value Date    CHOL 125 (L) 06/03/2023    CHOL 161 06/02/2023    CHOL 218 (H) 06/03/2021     Lab Results   Component Value Date    HDL 40 (L) 06/03/2023    HDL 45 (L) 06/02/2023    HDL 60 06/03/2021     Lab Results   Component Value Date    LDLCALC 55 (L) 06/03/2023    LDLCALC 82 06/02/2023    LDLCALC 129 06/03/2021     Lab Results   Component Value Date    TRIG 149 06/03/2023    TRIG 171 (H) 06/02/2023    TRIG 144 06/03/2021     No components found for: \"CHOLHDL\"  Lab Results   Component Value Date    LDLCALC 55 (L) 06/03/2023       ROS otherwise negative aside from what was mentioned above in HPI.    Objective   /74   Pulse 81   Wt 94 kg (207 lb 3.2 oz)   SpO2 98%   BMI 34.48 kg/m²     Physical Exam  Constitutional:       Appearance: Normal appearance.   Cardiovascular:      Rate and Rhythm: Normal rate and regular rhythm.      Pulses: Normal pulses.      Heart sounds: Normal heart sounds. No murmur heard.  Pulmonary:      Effort: " Pulmonary effort is normal.      Breath sounds: Normal breath sounds. No wheezing, rhonchi or rales.   Neurological:      Mental Status: She is alert.         Assessment/Plan   Interval increase in A1c from prior but still close to goal of <7. Will restart on Trulicity. Will need to start back at 0.5mg dose as she has been off x 3 mos. Will titrate up. Apologized for the issues with prior refill attempt.

## 2023-10-05 ENCOUNTER — APPOINTMENT (OUTPATIENT)
Dept: CARDIAC REHAB | Facility: CLINIC | Age: 73
End: 2023-10-05
Payer: COMMERCIAL

## 2023-10-10 ENCOUNTER — APPOINTMENT (OUTPATIENT)
Dept: CARDIAC REHAB | Facility: CLINIC | Age: 73
End: 2023-10-10
Payer: COMMERCIAL

## 2023-10-10 ENCOUNTER — CLINICAL SUPPORT (OUTPATIENT)
Dept: CARDIAC REHAB | Facility: CLINIC | Age: 73
End: 2023-10-10
Payer: COMMERCIAL

## 2023-10-10 DIAGNOSIS — Z95.5 STENTED CORONARY ARTERY: ICD-10-CM

## 2023-10-10 DIAGNOSIS — I21.4 NON-ST ELEVATION MYOCARDIAL INFARCTION (NSTEMI) (MULTI): ICD-10-CM

## 2023-10-10 PROCEDURE — 93798 PHYS/QHP OP CAR RHAB W/ECG: CPT | Performed by: INTERNAL MEDICINE

## 2023-10-12 ENCOUNTER — APPOINTMENT (OUTPATIENT)
Dept: CARDIAC REHAB | Facility: CLINIC | Age: 73
End: 2023-10-12
Payer: COMMERCIAL

## 2023-10-12 ENCOUNTER — CLINICAL SUPPORT (OUTPATIENT)
Dept: CARDIAC REHAB | Facility: CLINIC | Age: 73
End: 2023-10-12
Payer: COMMERCIAL

## 2023-10-12 DIAGNOSIS — Z95.5 STENTED CORONARY ARTERY: ICD-10-CM

## 2023-10-12 DIAGNOSIS — I21.4 NON-ST ELEVATION MYOCARDIAL INFARCTION (NSTEMI) (MULTI): ICD-10-CM

## 2023-10-12 PROCEDURE — 93798 PHYS/QHP OP CAR RHAB W/ECG: CPT | Performed by: INTERNAL MEDICINE

## 2023-10-17 ENCOUNTER — APPOINTMENT (OUTPATIENT)
Dept: CARDIAC REHAB | Facility: CLINIC | Age: 73
End: 2023-10-17
Payer: COMMERCIAL

## 2023-10-17 ENCOUNTER — CLINICAL SUPPORT (OUTPATIENT)
Dept: CARDIAC REHAB | Facility: CLINIC | Age: 73
End: 2023-10-17
Payer: COMMERCIAL

## 2023-10-17 DIAGNOSIS — Z95.5 STENTED CORONARY ARTERY: ICD-10-CM

## 2023-10-17 DIAGNOSIS — I21.4 NON-ST ELEVATION MYOCARDIAL INFARCTION (NSTEMI) (MULTI): ICD-10-CM

## 2023-10-17 PROCEDURE — 93798 PHYS/QHP OP CAR RHAB W/ECG: CPT | Performed by: INTERNAL MEDICINE

## 2023-10-19 ENCOUNTER — CLINICAL SUPPORT (OUTPATIENT)
Dept: CARDIAC REHAB | Facility: CLINIC | Age: 73
End: 2023-10-19
Payer: COMMERCIAL

## 2023-10-19 ENCOUNTER — APPOINTMENT (OUTPATIENT)
Dept: CARDIAC REHAB | Facility: CLINIC | Age: 73
End: 2023-10-19
Payer: COMMERCIAL

## 2023-10-19 DIAGNOSIS — I21.4 NON-ST ELEVATION MYOCARDIAL INFARCTION (NSTEMI) (MULTI): ICD-10-CM

## 2023-10-19 DIAGNOSIS — Z95.5 STENTED CORONARY ARTERY: ICD-10-CM

## 2023-10-19 PROCEDURE — 93798 PHYS/QHP OP CAR RHAB W/ECG: CPT | Performed by: INTERNAL MEDICINE

## 2023-10-24 ENCOUNTER — CLINICAL SUPPORT (OUTPATIENT)
Dept: CARDIAC REHAB | Facility: CLINIC | Age: 73
End: 2023-10-24
Payer: COMMERCIAL

## 2023-10-24 ENCOUNTER — APPOINTMENT (OUTPATIENT)
Dept: CARDIAC REHAB | Facility: CLINIC | Age: 73
End: 2023-10-24
Payer: COMMERCIAL

## 2023-10-24 DIAGNOSIS — I21.4 NON-ST ELEVATION MYOCARDIAL INFARCTION (NSTEMI) (MULTI): ICD-10-CM

## 2023-10-24 DIAGNOSIS — Z95.5 STENTED CORONARY ARTERY: ICD-10-CM

## 2023-10-24 PROCEDURE — 93798 PHYS/QHP OP CAR RHAB W/ECG: CPT | Performed by: INTERNAL MEDICINE

## 2023-10-26 ENCOUNTER — APPOINTMENT (OUTPATIENT)
Dept: CARDIAC REHAB | Facility: CLINIC | Age: 73
End: 2023-10-26
Payer: COMMERCIAL

## 2023-10-26 ENCOUNTER — CLINICAL SUPPORT (OUTPATIENT)
Dept: CARDIAC REHAB | Facility: CLINIC | Age: 73
End: 2023-10-26
Payer: COMMERCIAL

## 2023-10-26 DIAGNOSIS — Z95.5 STENTED CORONARY ARTERY: ICD-10-CM

## 2023-10-26 DIAGNOSIS — I21.4 NON-ST ELEVATION MYOCARDIAL INFARCTION (NSTEMI) (MULTI): ICD-10-CM

## 2023-10-26 PROCEDURE — 93798 PHYS/QHP OP CAR RHAB W/ECG: CPT | Performed by: INTERNAL MEDICINE

## 2023-10-31 ENCOUNTER — APPOINTMENT (OUTPATIENT)
Dept: CARDIAC REHAB | Facility: CLINIC | Age: 73
End: 2023-10-31
Payer: COMMERCIAL

## 2023-10-31 ENCOUNTER — CLINICAL SUPPORT (OUTPATIENT)
Dept: CARDIAC REHAB | Facility: CLINIC | Age: 73
End: 2023-10-31
Payer: COMMERCIAL

## 2023-10-31 DIAGNOSIS — Z95.5 STENTED CORONARY ARTERY: ICD-10-CM

## 2023-10-31 DIAGNOSIS — I21.4 NON-ST ELEVATION MYOCARDIAL INFARCTION (NSTEMI) (MULTI): ICD-10-CM

## 2023-10-31 PROCEDURE — 93798 PHYS/QHP OP CAR RHAB W/ECG: CPT | Performed by: INTERNAL MEDICINE

## 2023-11-02 ENCOUNTER — CLINICAL SUPPORT (OUTPATIENT)
Dept: CARDIAC REHAB | Facility: CLINIC | Age: 73
End: 2023-11-02
Payer: COMMERCIAL

## 2023-11-02 ENCOUNTER — APPOINTMENT (OUTPATIENT)
Dept: CARDIAC REHAB | Facility: CLINIC | Age: 73
End: 2023-11-02
Payer: COMMERCIAL

## 2023-11-02 DIAGNOSIS — I21.4 NON-ST ELEVATION MYOCARDIAL INFARCTION (NSTEMI) (MULTI): ICD-10-CM

## 2023-11-02 DIAGNOSIS — Z95.5 STENTED CORONARY ARTERY: ICD-10-CM

## 2023-11-02 PROCEDURE — 93798 PHYS/QHP OP CAR RHAB W/ECG: CPT | Performed by: INTERNAL MEDICINE

## 2023-11-07 ENCOUNTER — PHARMACY VISIT (OUTPATIENT)
Dept: PHARMACY | Facility: CLINIC | Age: 73
End: 2023-11-07
Payer: COMMERCIAL

## 2023-11-07 ENCOUNTER — APPOINTMENT (OUTPATIENT)
Dept: CARDIAC REHAB | Facility: CLINIC | Age: 73
End: 2023-11-07
Payer: COMMERCIAL

## 2023-11-07 DIAGNOSIS — E11.9 TYPE 2 DIABETES MELLITUS WITHOUT COMPLICATION, WITHOUT LONG-TERM CURRENT USE OF INSULIN (MULTI): ICD-10-CM

## 2023-11-07 PROCEDURE — RXMED WILLOW AMBULATORY MEDICATION CHARGE

## 2023-11-07 RX ORDER — DULAGLUTIDE 1.5 MG/.5ML
1.5 INJECTION, SOLUTION SUBCUTANEOUS
Qty: 6 ML | Refills: 0 | Status: ON HOLD | OUTPATIENT
Start: 2023-11-07 | End: 2024-06-05 | Stop reason: ALTCHOICE

## 2023-11-09 ENCOUNTER — CLINICAL SUPPORT (OUTPATIENT)
Dept: CARDIAC REHAB | Facility: CLINIC | Age: 73
End: 2023-11-09
Payer: COMMERCIAL

## 2023-11-09 ENCOUNTER — APPOINTMENT (OUTPATIENT)
Dept: CARDIAC REHAB | Facility: CLINIC | Age: 73
End: 2023-11-09
Payer: COMMERCIAL

## 2023-11-09 DIAGNOSIS — Z95.5 STENTED CORONARY ARTERY: ICD-10-CM

## 2023-11-09 DIAGNOSIS — I21.4 NON-ST ELEVATION MYOCARDIAL INFARCTION (NSTEMI) (MULTI): ICD-10-CM

## 2023-11-09 PROCEDURE — 93798 PHYS/QHP OP CAR RHAB W/ECG: CPT | Performed by: INTERNAL MEDICINE

## 2023-11-14 ENCOUNTER — APPOINTMENT (OUTPATIENT)
Dept: CARDIAC REHAB | Facility: CLINIC | Age: 73
End: 2023-11-14
Payer: COMMERCIAL

## 2023-11-14 ENCOUNTER — CLINICAL SUPPORT (OUTPATIENT)
Dept: CARDIAC REHAB | Facility: CLINIC | Age: 73
End: 2023-11-14
Payer: COMMERCIAL

## 2023-11-14 DIAGNOSIS — I21.4 NON-ST ELEVATION MYOCARDIAL INFARCTION (NSTEMI) (MULTI): ICD-10-CM

## 2023-11-14 DIAGNOSIS — Z95.5 STENTED CORONARY ARTERY: ICD-10-CM

## 2023-11-14 PROCEDURE — 93798 PHYS/QHP OP CAR RHAB W/ECG: CPT | Performed by: INTERNAL MEDICINE

## 2023-11-16 ENCOUNTER — APPOINTMENT (OUTPATIENT)
Dept: CARDIAC REHAB | Facility: CLINIC | Age: 73
End: 2023-11-16
Payer: COMMERCIAL

## 2023-11-16 ENCOUNTER — CLINICAL SUPPORT (OUTPATIENT)
Dept: CARDIAC REHAB | Facility: CLINIC | Age: 73
End: 2023-11-16
Payer: COMMERCIAL

## 2023-11-16 DIAGNOSIS — Z95.5 STENTED CORONARY ARTERY: ICD-10-CM

## 2023-11-16 DIAGNOSIS — I21.4 NON-ST ELEVATION MYOCARDIAL INFARCTION (NSTEMI) (MULTI): ICD-10-CM

## 2023-11-16 PROCEDURE — 93798 PHYS/QHP OP CAR RHAB W/ECG: CPT | Performed by: INTERNAL MEDICINE

## 2023-11-21 ENCOUNTER — CLINICAL SUPPORT (OUTPATIENT)
Dept: CARDIAC REHAB | Facility: CLINIC | Age: 73
End: 2023-11-21
Payer: COMMERCIAL

## 2023-11-21 ENCOUNTER — APPOINTMENT (OUTPATIENT)
Dept: CARDIAC REHAB | Facility: CLINIC | Age: 73
End: 2023-11-21
Payer: COMMERCIAL

## 2023-11-21 DIAGNOSIS — Z95.5 STENTED CORONARY ARTERY: ICD-10-CM

## 2023-11-21 DIAGNOSIS — I21.4 NON-ST ELEVATION MYOCARDIAL INFARCTION (NSTEMI) (MULTI): ICD-10-CM

## 2023-11-28 ENCOUNTER — APPOINTMENT (OUTPATIENT)
Dept: CARDIAC REHAB | Facility: CLINIC | Age: 73
End: 2023-11-28
Payer: COMMERCIAL

## 2023-11-30 ENCOUNTER — CLINICAL SUPPORT (OUTPATIENT)
Dept: CARDIAC REHAB | Facility: CLINIC | Age: 73
End: 2023-11-30
Payer: COMMERCIAL

## 2023-11-30 ENCOUNTER — APPOINTMENT (OUTPATIENT)
Dept: CARDIAC REHAB | Facility: CLINIC | Age: 73
End: 2023-11-30
Payer: COMMERCIAL

## 2023-11-30 DIAGNOSIS — E11.9 TYPE 2 DIABETES MELLITUS WITHOUT COMPLICATION, WITHOUT LONG-TERM CURRENT USE OF INSULIN (MULTI): Primary | ICD-10-CM

## 2023-11-30 DIAGNOSIS — I21.4 NON-ST ELEVATION MYOCARDIAL INFARCTION (NSTEMI) (MULTI): ICD-10-CM

## 2023-11-30 DIAGNOSIS — Z95.5 STENTED CORONARY ARTERY: ICD-10-CM

## 2023-11-30 PROCEDURE — 93798 PHYS/QHP OP CAR RHAB W/ECG: CPT | Performed by: INTERNAL MEDICINE

## 2023-11-30 RX ORDER — METFORMIN HYDROCHLORIDE 1000 MG/1
1000 TABLET ORAL 2 TIMES DAILY
Qty: 180 TABLET | Refills: 3 | Status: SHIPPED | OUTPATIENT
Start: 2023-11-30

## 2023-12-05 ENCOUNTER — APPOINTMENT (OUTPATIENT)
Dept: CARDIAC REHAB | Facility: CLINIC | Age: 73
End: 2023-12-05
Payer: COMMERCIAL

## 2023-12-05 ENCOUNTER — CLINICAL SUPPORT (OUTPATIENT)
Dept: CARDIAC REHAB | Facility: CLINIC | Age: 73
End: 2023-12-05
Payer: COMMERCIAL

## 2023-12-05 DIAGNOSIS — Z95.5 STENTED CORONARY ARTERY: ICD-10-CM

## 2023-12-05 DIAGNOSIS — I21.4 NON-ST ELEVATION MYOCARDIAL INFARCTION (NSTEMI) (MULTI): ICD-10-CM

## 2023-12-05 PROCEDURE — 93798 PHYS/QHP OP CAR RHAB W/ECG: CPT | Performed by: INTERNAL MEDICINE

## 2023-12-07 ENCOUNTER — CLINICAL SUPPORT (OUTPATIENT)
Dept: CARDIAC REHAB | Facility: CLINIC | Age: 73
End: 2023-12-07
Payer: COMMERCIAL

## 2023-12-07 ENCOUNTER — APPOINTMENT (OUTPATIENT)
Dept: CARDIAC REHAB | Facility: CLINIC | Age: 73
End: 2023-12-07
Payer: COMMERCIAL

## 2023-12-07 DIAGNOSIS — I21.4 NON-ST ELEVATION MYOCARDIAL INFARCTION (NSTEMI) (MULTI): ICD-10-CM

## 2023-12-07 DIAGNOSIS — Z95.5 STENTED CORONARY ARTERY: ICD-10-CM

## 2023-12-07 PROCEDURE — 93798 PHYS/QHP OP CAR RHAB W/ECG: CPT | Performed by: INTERNAL MEDICINE

## 2023-12-09 ENCOUNTER — PHARMACY VISIT (OUTPATIENT)
Dept: PHARMACY | Facility: CLINIC | Age: 73
End: 2023-12-09
Payer: COMMERCIAL

## 2023-12-09 PROCEDURE — RXMED WILLOW AMBULATORY MEDICATION CHARGE

## 2023-12-12 ENCOUNTER — APPOINTMENT (OUTPATIENT)
Dept: CARDIAC REHAB | Facility: CLINIC | Age: 73
End: 2023-12-12
Payer: COMMERCIAL

## 2023-12-12 ENCOUNTER — CLINICAL SUPPORT (OUTPATIENT)
Dept: CARDIAC REHAB | Facility: CLINIC | Age: 73
End: 2023-12-12
Payer: COMMERCIAL

## 2023-12-12 DIAGNOSIS — Z95.5 STENTED CORONARY ARTERY: ICD-10-CM

## 2023-12-12 DIAGNOSIS — I21.4 NON-ST ELEVATION MYOCARDIAL INFARCTION (NSTEMI) (MULTI): ICD-10-CM

## 2023-12-12 PROCEDURE — 93798 PHYS/QHP OP CAR RHAB W/ECG: CPT | Performed by: INTERNAL MEDICINE

## 2023-12-14 ENCOUNTER — APPOINTMENT (OUTPATIENT)
Dept: CARDIAC REHAB | Facility: CLINIC | Age: 73
End: 2023-12-14
Payer: COMMERCIAL

## 2023-12-14 ENCOUNTER — CLINICAL SUPPORT (OUTPATIENT)
Dept: CARDIAC REHAB | Facility: CLINIC | Age: 73
End: 2023-12-14
Payer: COMMERCIAL

## 2023-12-14 DIAGNOSIS — Z95.5 STENTED CORONARY ARTERY: ICD-10-CM

## 2023-12-14 DIAGNOSIS — I21.4 NON-ST ELEVATION MYOCARDIAL INFARCTION (NSTEMI) (MULTI): ICD-10-CM

## 2023-12-14 PROCEDURE — 93798 PHYS/QHP OP CAR RHAB W/ECG: CPT | Performed by: INTERNAL MEDICINE

## 2023-12-19 ENCOUNTER — APPOINTMENT (OUTPATIENT)
Dept: CARDIAC REHAB | Facility: CLINIC | Age: 73
End: 2023-12-19
Payer: COMMERCIAL

## 2023-12-19 ENCOUNTER — CLINICAL SUPPORT (OUTPATIENT)
Dept: CARDIAC REHAB | Facility: CLINIC | Age: 73
End: 2023-12-19
Payer: COMMERCIAL

## 2023-12-19 DIAGNOSIS — I21.4 NON-ST ELEVATION MYOCARDIAL INFARCTION (NSTEMI) (MULTI): ICD-10-CM

## 2023-12-19 DIAGNOSIS — Z95.5 STENTED CORONARY ARTERY: ICD-10-CM

## 2023-12-19 PROCEDURE — 93798 PHYS/QHP OP CAR RHAB W/ECG: CPT | Performed by: INTERNAL MEDICINE

## 2023-12-21 ENCOUNTER — CLINICAL SUPPORT (OUTPATIENT)
Dept: CARDIAC REHAB | Facility: CLINIC | Age: 73
End: 2023-12-21
Payer: COMMERCIAL

## 2023-12-21 ENCOUNTER — APPOINTMENT (OUTPATIENT)
Dept: CARDIAC REHAB | Facility: CLINIC | Age: 73
End: 2023-12-21
Payer: COMMERCIAL

## 2023-12-21 DIAGNOSIS — I21.4 NON-ST ELEVATION MYOCARDIAL INFARCTION (NSTEMI) (MULTI): ICD-10-CM

## 2023-12-21 DIAGNOSIS — Z95.5 STENTED CORONARY ARTERY: ICD-10-CM

## 2023-12-21 PROCEDURE — 93798 PHYS/QHP OP CAR RHAB W/ECG: CPT | Performed by: INTERNAL MEDICINE

## 2023-12-26 ENCOUNTER — APPOINTMENT (OUTPATIENT)
Dept: CARDIAC REHAB | Facility: CLINIC | Age: 73
End: 2023-12-26
Payer: COMMERCIAL

## 2023-12-28 ENCOUNTER — APPOINTMENT (OUTPATIENT)
Dept: CARDIAC REHAB | Facility: CLINIC | Age: 73
End: 2023-12-28
Payer: COMMERCIAL

## 2023-12-28 ENCOUNTER — CLINICAL SUPPORT (OUTPATIENT)
Dept: CARDIAC REHAB | Facility: CLINIC | Age: 73
End: 2023-12-28
Payer: COMMERCIAL

## 2023-12-28 DIAGNOSIS — Z95.5 STENTED CORONARY ARTERY: ICD-10-CM

## 2023-12-28 DIAGNOSIS — I21.4 NON-ST ELEVATION MYOCARDIAL INFARCTION (NSTEMI) (MULTI): ICD-10-CM

## 2023-12-29 ENCOUNTER — CLINICAL SUPPORT (OUTPATIENT)
Dept: CARDIAC REHAB | Facility: CLINIC | Age: 73
End: 2023-12-29
Payer: COMMERCIAL

## 2023-12-29 DIAGNOSIS — I21.4 NON-ST ELEVATION MYOCARDIAL INFARCTION (NSTEMI) (MULTI): ICD-10-CM

## 2023-12-29 DIAGNOSIS — Z95.5 STENTED CORONARY ARTERY: ICD-10-CM

## 2024-01-02 ENCOUNTER — APPOINTMENT (OUTPATIENT)
Dept: CARDIAC REHAB | Facility: CLINIC | Age: 74
End: 2024-01-02
Payer: COMMERCIAL

## 2024-01-03 ENCOUNTER — TELEPHONE (OUTPATIENT)
Dept: PRIMARY CARE | Facility: CLINIC | Age: 74
End: 2024-01-03

## 2024-01-03 ENCOUNTER — OFFICE VISIT (OUTPATIENT)
Dept: PRIMARY CARE | Facility: CLINIC | Age: 74
End: 2024-01-03
Payer: COMMERCIAL

## 2024-01-03 VITALS
WEIGHT: 200 LBS | SYSTOLIC BLOOD PRESSURE: 138 MMHG | BODY MASS INDEX: 33.28 KG/M2 | HEART RATE: 98 BPM | DIASTOLIC BLOOD PRESSURE: 82 MMHG | OXYGEN SATURATION: 98 %

## 2024-01-03 DIAGNOSIS — I48.91 ATRIAL FIBRILLATION WITH RVR (MULTI): Primary | ICD-10-CM

## 2024-01-03 DIAGNOSIS — E11.9 TYPE 2 DIABETES MELLITUS WITHOUT COMPLICATION, UNSPECIFIED WHETHER LONG TERM INSULIN USE (MULTI): ICD-10-CM

## 2024-01-03 LAB — POC HEMOGLOBIN A1C: 7.5 % (ref 4.2–6.5)

## 2024-01-03 PROCEDURE — 1036F TOBACCO NON-USER: CPT | Performed by: STUDENT IN AN ORGANIZED HEALTH CARE EDUCATION/TRAINING PROGRAM

## 2024-01-03 PROCEDURE — 4010F ACE/ARB THERAPY RXD/TAKEN: CPT | Performed by: STUDENT IN AN ORGANIZED HEALTH CARE EDUCATION/TRAINING PROGRAM

## 2024-01-03 PROCEDURE — 3075F SYST BP GE 130 - 139MM HG: CPT | Performed by: STUDENT IN AN ORGANIZED HEALTH CARE EDUCATION/TRAINING PROGRAM

## 2024-01-03 PROCEDURE — 99214 OFFICE O/P EST MOD 30 MIN: CPT | Performed by: STUDENT IN AN ORGANIZED HEALTH CARE EDUCATION/TRAINING PROGRAM

## 2024-01-03 PROCEDURE — 83036 HEMOGLOBIN GLYCOSYLATED A1C: CPT | Performed by: STUDENT IN AN ORGANIZED HEALTH CARE EDUCATION/TRAINING PROGRAM

## 2024-01-03 PROCEDURE — 3079F DIAST BP 80-89 MM HG: CPT | Performed by: STUDENT IN AN ORGANIZED HEALTH CARE EDUCATION/TRAINING PROGRAM

## 2024-01-03 PROCEDURE — 3008F BODY MASS INDEX DOCD: CPT | Performed by: STUDENT IN AN ORGANIZED HEALTH CARE EDUCATION/TRAINING PROGRAM

## 2024-01-03 PROCEDURE — 90750 HZV VACC RECOMBINANT IM: CPT | Performed by: STUDENT IN AN ORGANIZED HEALTH CARE EDUCATION/TRAINING PROGRAM

## 2024-01-03 PROCEDURE — 1160F RVW MEDS BY RX/DR IN RCRD: CPT | Performed by: STUDENT IN AN ORGANIZED HEALTH CARE EDUCATION/TRAINING PROGRAM

## 2024-01-03 PROCEDURE — 1126F AMNT PAIN NOTED NONE PRSNT: CPT | Performed by: STUDENT IN AN ORGANIZED HEALTH CARE EDUCATION/TRAINING PROGRAM

## 2024-01-03 PROCEDURE — 90471 IMMUNIZATION ADMIN: CPT | Performed by: STUDENT IN AN ORGANIZED HEALTH CARE EDUCATION/TRAINING PROGRAM

## 2024-01-03 PROCEDURE — 1159F MED LIST DOCD IN RCRD: CPT | Performed by: STUDENT IN AN ORGANIZED HEALTH CARE EDUCATION/TRAINING PROGRAM

## 2024-01-03 ASSESSMENT — PAIN SCALES - GENERAL: PAINLEVEL: 0-NO PAIN

## 2024-01-03 ASSESSMENT — PATIENT HEALTH QUESTIONNAIRE - PHQ9
SUM OF ALL RESPONSES TO PHQ9 QUESTIONS 1 AND 2: 0
1. LITTLE INTEREST OR PLEASURE IN DOING THINGS: NOT AT ALL
2. FEELING DOWN, DEPRESSED OR HOPELESS: NOT AT ALL

## 2024-01-03 NOTE — PROGRESS NOTES
"Subjective   Kristi Jackson is a 73 y.o. female who presents for Diabetes Follow up  No pre-appt labs available for review    INTERVAL HX/CURRENT CONCERNS  Has had ongoing congestion since 10/2023. Has been using antihistamines and does help. Mostly in evening.   Recently completed cardiac rehab. Following with Dr. Rodriguez  Incontinence - off and on, not very bothersome, not interested in treatment at this time  Colonoscopy scheduled later this month    T2DM  Current medication regimen:   -Trulicity 1.5 mg  -Metformin XR 1000mg BID  Tolerating current medication regimen yes  Blood sugars at home averaging 120-160's  CGM? no - not covered by insurance  Hypoglycemic episodes no  Last A1C : 7.5 (POC 1/03/24), 7.1, 5.9   Last eGFR - 95  Last Creatinine - 0.6  Last microalbumin -  Up to date on yearly eye exams yes  -Last eye exam - 12/2023 Dr. Cavanaugh  Follows with podiatry no  - Statin? Yes - known ASCVD  - ACE-I/ARB? Yes  - Aspirin? No  - Last diabetic foot exam:  - Pneumococcal vaccine? Done  RSV, covid, flu done    LAB REVIEW   Glucose (MG/DL)   Date Value   09/07/2023 335 (H)   09/06/2023 216 (H)   06/30/2023 129 (H)     Hemoglobin A1C (%)   Date Value   09/06/2023 7.1 (H)   06/29/2023 5.9   06/02/2023 5.9     Creatinine (MG/DL)   Date Value   09/07/2023 0.6   09/06/2023 0.7   06/30/2023 0.7     Lab Results   Component Value Date    ALBUR 12 06/29/2023    LDLCALC 55 (L) 06/03/2023    CREATININE 0.6 09/07/2023     Lab Results   Component Value Date    CHOL 125 (L) 06/03/2023    CHOL 161 06/02/2023    CHOL 218 (H) 06/03/2021     Lab Results   Component Value Date    HDL 40 (L) 06/03/2023    HDL 45 (L) 06/02/2023    HDL 60 06/03/2021     Lab Results   Component Value Date    LDLCALC 55 (L) 06/03/2023    LDLCALC 82 06/02/2023    LDLCALC 129 06/03/2021     Lab Results   Component Value Date    TRIG 149 06/03/2023    TRIG 171 (H) 06/02/2023    TRIG 144 06/03/2021     No components found for: \"CHOLHDL\"  Lab Results "   Component Value Date    LDLCALC 55 (L) 06/03/2023       ROS otherwise negative aside from what was mentioned above in HPI.      Objective   /82   Pulse 98   Wt 90.7 kg (200 lb)   SpO2 98%   BMI 33.28 kg/m²     Physical Exam  Vitals and nursing note reviewed.   Constitutional:       General: She is not in acute distress.     Appearance: She is not ill-appearing.   Cardiovascular:      Rate and Rhythm: Normal rate and regular rhythm.      Heart sounds: Normal heart sounds. No murmur heard.  Pulmonary:      Effort: Pulmonary effort is normal. No respiratory distress.      Breath sounds: Normal breath sounds. No wheezing, rhonchi or rales.   Musculoskeletal:      Right lower leg: No edema.      Left lower leg: No edema.         Assessment/Plan   1. Type 2 diabetes mellitus without complication, unspecified whether long term insulin use (CMS/[facility])  Needs better control. Discussed increasing trulicity dose but she declines at this time. Would like to focus diet and exercise efforts over next 3 mos and if no improvement will be amenable to medication titration. Consider changing from truliciy to GLP1/GIP if cost effective. Continue monitoring home blood sugars. Follow up 3 mos.  - POCT glycosylated hemoglobin (Hb A1C) manually resulted

## 2024-01-04 ENCOUNTER — APPOINTMENT (OUTPATIENT)
Dept: CARDIAC REHAB | Facility: CLINIC | Age: 74
End: 2024-01-04
Payer: COMMERCIAL

## 2024-01-04 PROCEDURE — RXMED WILLOW AMBULATORY MEDICATION CHARGE

## 2024-01-05 ENCOUNTER — PHARMACY VISIT (OUTPATIENT)
Dept: PHARMACY | Facility: CLINIC | Age: 74
End: 2024-01-05
Payer: COMMERCIAL

## 2024-01-09 ENCOUNTER — APPOINTMENT (OUTPATIENT)
Dept: CARDIAC REHAB | Facility: CLINIC | Age: 74
End: 2024-01-09
Payer: COMMERCIAL

## 2024-01-11 ENCOUNTER — APPOINTMENT (OUTPATIENT)
Dept: CARDIAC REHAB | Facility: CLINIC | Age: 74
End: 2024-01-11
Payer: COMMERCIAL

## 2024-01-16 ENCOUNTER — APPOINTMENT (OUTPATIENT)
Dept: CARDIAC REHAB | Facility: CLINIC | Age: 74
End: 2024-01-16
Payer: COMMERCIAL

## 2024-01-18 ENCOUNTER — APPOINTMENT (OUTPATIENT)
Dept: CARDIAC REHAB | Facility: CLINIC | Age: 74
End: 2024-01-18
Payer: COMMERCIAL

## 2024-01-23 ENCOUNTER — APPOINTMENT (OUTPATIENT)
Dept: CARDIAC REHAB | Facility: CLINIC | Age: 74
End: 2024-01-23
Payer: COMMERCIAL

## 2024-01-25 ENCOUNTER — APPOINTMENT (OUTPATIENT)
Dept: CARDIAC REHAB | Facility: CLINIC | Age: 74
End: 2024-01-25
Payer: COMMERCIAL

## 2024-01-30 ENCOUNTER — APPOINTMENT (OUTPATIENT)
Dept: CARDIAC REHAB | Facility: CLINIC | Age: 74
End: 2024-01-30
Payer: COMMERCIAL

## 2024-02-01 ENCOUNTER — APPOINTMENT (OUTPATIENT)
Dept: CARDIAC REHAB | Facility: CLINIC | Age: 74
End: 2024-02-01
Payer: COMMERCIAL

## 2024-02-01 DIAGNOSIS — E11.9 TYPE 2 DIABETES MELLITUS WITHOUT COMPLICATION, WITHOUT LONG-TERM CURRENT USE OF INSULIN (MULTI): Primary | ICD-10-CM

## 2024-02-01 PROCEDURE — RXMED WILLOW AMBULATORY MEDICATION CHARGE

## 2024-02-01 RX ORDER — BLOOD-GLUCOSE METER
EACH MISCELLANEOUS
Qty: 300 STRIP | Refills: 3 | Status: SHIPPED | OUTPATIENT
Start: 2024-02-01

## 2024-02-02 ENCOUNTER — PHARMACY VISIT (OUTPATIENT)
Dept: PHARMACY | Facility: CLINIC | Age: 74
End: 2024-02-02
Payer: COMMERCIAL

## 2024-02-06 ENCOUNTER — APPOINTMENT (OUTPATIENT)
Dept: CARDIAC REHAB | Facility: CLINIC | Age: 74
End: 2024-02-06
Payer: COMMERCIAL

## 2024-02-08 ENCOUNTER — APPOINTMENT (OUTPATIENT)
Dept: CARDIAC REHAB | Facility: CLINIC | Age: 74
End: 2024-02-08
Payer: COMMERCIAL

## 2024-02-09 ENCOUNTER — APPOINTMENT (OUTPATIENT)
Dept: RADIOLOGY | Facility: HOSPITAL | Age: 74
End: 2024-02-09
Payer: COMMERCIAL

## 2024-02-09 ENCOUNTER — HOSPITAL ENCOUNTER (EMERGENCY)
Facility: HOSPITAL | Age: 74
Discharge: HOME | End: 2024-02-09
Payer: COMMERCIAL

## 2024-02-09 VITALS
HEIGHT: 64 IN | OXYGEN SATURATION: 99 % | BODY MASS INDEX: 32.27 KG/M2 | DIASTOLIC BLOOD PRESSURE: 78 MMHG | HEART RATE: 83 BPM | RESPIRATION RATE: 17 BRPM | WEIGHT: 189 LBS | TEMPERATURE: 97.7 F | SYSTOLIC BLOOD PRESSURE: 105 MMHG
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
## 2024-02-09 DIAGNOSIS — S42.292A HUMERAL HEAD FRACTURE, LEFT, CLOSED, INITIAL ENCOUNTER: ICD-10-CM

## 2024-02-09 LAB — GLUCOSE BLD MANUAL STRIP-MCNC: 157 MG/DL (ref 74–99)

## 2024-02-09 PROCEDURE — 99285 EMERGENCY DEPT VISIT HI MDM: CPT | Mod: 25

## 2024-02-09 PROCEDURE — 71046 X-RAY EXAM CHEST 2 VIEWS: CPT

## 2024-02-09 PROCEDURE — 73080 X-RAY EXAM OF ELBOW: CPT | Mod: LT

## 2024-02-09 PROCEDURE — 70450 CT HEAD/BRAIN W/O DYE: CPT

## 2024-02-09 PROCEDURE — 73030 X-RAY EXAM OF SHOULDER: CPT | Mod: LT

## 2024-02-09 PROCEDURE — 72125 CT NECK SPINE W/O DYE: CPT

## 2024-02-09 PROCEDURE — 82947 ASSAY GLUCOSE BLOOD QUANT: CPT

## 2024-02-09 PROCEDURE — 73110 X-RAY EXAM OF WRIST: CPT | Mod: LT

## 2024-02-09 RX ORDER — HYDROCODONE BITARTRATE AND ACETAMINOPHEN 5; 325 MG/1; MG/1
1 TABLET ORAL EVERY 6 HOURS PRN
Qty: 10 TABLET | Refills: 0 | Status: SHIPPED | OUTPATIENT
Start: 2024-02-09 | End: 2024-02-09 | Stop reason: SDUPTHER

## 2024-02-09 RX ORDER — HYDROCODONE BITARTRATE AND ACETAMINOPHEN 5; 325 MG/1; MG/1
1 TABLET ORAL EVERY 6 HOURS PRN
Qty: 10 TABLET | Refills: 0 | Status: ON HOLD | OUTPATIENT
Start: 2024-02-09 | End: 2024-06-05 | Stop reason: ALTCHOICE

## 2024-02-09 RX ORDER — ACETAMINOPHEN 325 MG/1
650 TABLET ORAL ONCE
Status: COMPLETED | OUTPATIENT
Start: 2024-02-09 | End: 2024-02-09

## 2024-02-09 RX ADMIN — ACETAMINOPHEN 650 MG: 325 TABLET ORAL at 13:57

## 2024-02-09 ASSESSMENT — PAIN - FUNCTIONAL ASSESSMENT
PAIN_FUNCTIONAL_ASSESSMENT: 0-10
PAIN_FUNCTIONAL_ASSESSMENT: 0-10

## 2024-02-09 ASSESSMENT — PAIN SCALES - GENERAL
PAINLEVEL_OUTOF10: 7
PAINLEVEL_OUTOF10: 5 - MODERATE PAIN

## 2024-02-09 ASSESSMENT — PAIN DESCRIPTION - LOCATION: LOCATION: ARM

## 2024-02-09 ASSESSMENT — PAIN DESCRIPTION - PAIN TYPE: TYPE: ACUTE PAIN

## 2024-02-09 ASSESSMENT — COLUMBIA-SUICIDE SEVERITY RATING SCALE - C-SSRS
6. HAVE YOU EVER DONE ANYTHING, STARTED TO DO ANYTHING, OR PREPARED TO DO ANYTHING TO END YOUR LIFE?: NO
1. IN THE PAST MONTH, HAVE YOU WISHED YOU WERE DEAD OR WISHED YOU COULD GO TO SLEEP AND NOT WAKE UP?: NO
2. HAVE YOU ACTUALLY HAD ANY THOUGHTS OF KILLING YOURSELF?: NO

## 2024-02-09 ASSESSMENT — PAIN DESCRIPTION - ORIENTATION: ORIENTATION: LEFT

## 2024-02-09 ASSESSMENT — PAIN DESCRIPTION - DESCRIPTORS: DESCRIPTORS: ACHING

## 2024-02-09 ASSESSMENT — PAIN DESCRIPTION - FREQUENCY: FREQUENCY: CONSTANT/CONTINUOUS

## 2024-02-09 NOTE — DISCHARGE INSTRUCTIONS
These keep sling on at all times other than when you are changing your clothes.   Please take Norco as needed for breakthrough pain.  Please take over-the-counter Tylenol  Please follow-up with Dr. Nick Mcgrath on Monday or Tuesday of this week.  Please call his office on Monday morning to schedule an appointment.  A stat consult was placed to his office.  Please follow-up with your primary care doctor in the next week.  CT scan did show sinus disease.  Please discuss these findings with your primary care doctor.  Please return to the emergency department with new or worsening symptoms or the onset of new symptoms.

## 2024-02-09 NOTE — ED PROVIDER NOTES
HPI   Chief Complaint   Patient presents with    Fall     Fall with left face and arm pain, lost footing       This is a 73-year-old female who presents to the emergency department after sustaining a mechanical fall earlier today about an hour ago.  Patient states that she was on a hill picking up sticks.  She states that there was mad.  She states that she slipped on the mud and fell forward.  She states she landed predominantly on her head and her left shoulder.  Patient states that she has shoulder pain in the front part.  Patient states that she has mild neck pain and mild head pain.  She does take Eliquis daily.  Patient states that she was able to walk immediately after the fall.  She denies loss of consciousness or preceding dizziness, lightheadedness, chest pain prior to the fall.  She states that she remembers the entire event.  She drove herself to the emergency department.  She states that she is unable to lift her left arm due to the pain.  She denies loss of sensation in her left hand.      Please see HPI for pertinent positive and negative ROS.                     Manson Coma Scale Score: 15                     Patient History   No past medical history on file.  Past Surgical History:   Procedure Laterality Date    MR HEAD ANGIO WO IV CONTRAST  6/3/2021    MR HEAD ANGIO WO IV CONTRAST [facility]     No family history on file.  Social History     Tobacco Use    Smoking status: Never    Smokeless tobacco: Never   Vaping Use    Vaping Use: Never used   Substance Use Topics    Alcohol use: Yes    Drug use: Never       Physical Exam   ED Triage Vitals [02/09/24 1341]   Temperature Heart Rate Respirations BP   36.4 °C (97.5 °F) (!) 105 18 119/62      Pulse Ox Temp Source Heart Rate Source Patient Position   99 % Oral Monitor Sitting      BP Location FiO2 (%)     Right arm --       Physical Exam  GENERAL APPEARANCE: Awake and alert. No acute distress.   VITAL SIGNS: As per the nurses' triage  record.  HEENT: Normocephalic, atraumatic. Extraocular muscles are intact.  PERRLA bilaterally.  Conjunctiva are pink. Negative scleral icterus. Mucous membranes are moist. Tongue in the midline. Oropharynx clear, uvula midline.   Orbital bones grossly intact without ecchymosis or tenderness to palpation bilaterally. TM grey and intact bilateral without hemotympanum. No nasal gross abnormalities. No septal hematoma.  No midface instability.  No facial abrasions.  NECK: Soft, nontender and supple, full gross ROM, no meningeal signs.  There are no bony step-offs appreciated over cervical midline spinous processes.  CHEST: Tenderness to palpation over the left lateral chest wall.  There is no overlying bruising, and there is no crepitus. Clear to auscultation bilaterally. No rales, rhonchi, or wheezing. Symmetric rise and fall of chest wall.   HEART: Clear S1 and S2. Regular rate and rhythm. No murmurs appreciated on auscultation.  Strong and equal radial pulses bilaterally and posterior tibialis pulses bilaterally.  ABDOMEN: Soft, nontender, nondistended, positive bowel sounds, no palpable masses.  MUSCULOSKELETAL: The calves are nontender to palpation.  Patient has full active range of motion of legs bilaterally and left arm.  Patient has decreased range of motion of left arm.  She is able to move her left wrist and left elbow fully but there is pain with movement and tenderness to palpation over the left wrist and left elbow. She is unable to lift her arm.  She has significant tenderness to palpation over the humeral region of her left shoulder.  There is no overlying skin changes or abrasions.  5 out of 5 strength of lower extremities bilaterally and right upper extremity.  5 out of 5  strength bilaterally of hands.  Cannot test strength of left arm due to inability to left upper arm due to pain of the left shoulder. ambulating on own with no acute difficulties  NEUROLOGICAL: Awake, alert and oriented x 3.   Sensation is intact over median, radial, and ulnar nerve distributions of arms bilaterally.  Sensation is intact of legs and feet bilaterally.  Brisk capillary refill of hands bilaterally.  Patient answering questions appropriately.  Cranial nerves II through XII grossly intact bilaterally.  IMMUNOLOGICAL: No lymphatic streaking noted  DERMATOLOGIC: Warm and dry without petechiae, rashes, or ecchymosis noted on visible skin.   PYSCH: Cooperative with appropriate mood and affect.    ED Course & MDM   ED Course as of 02/09/24 2042 Fri Feb 09, 2024   1614 X-ray reports reviewed.  X-ray of right shoulder shows minimally displaced fracture of lateral humeral head extending into humeral neck [SC]   1734 CT head wo IV contrast [SC]      ED Course User Index  [SC] Lacy Guy PA-C         Diagnoses as of 02/09/24 2042   Fall, initial encounter   Humeral head fracture, left, closed, initial encounter       Medical Decision Making  Parts of this chart have been completed using voice recognition software. Please excuse any errors of transcription.  My thought process and reason for plan has been formulated from the time that I saw the patient until the time of disposition and is not specific to one specific moment during their visit and furthermore my MDM encompasses this entire chart and not only this text box.      HPI: Detailed above.    Exam: A medically appropriate exam performed, outlined above, given the known history and presentation.    History obtained from: Patient    Social Determinants of Health considered during this visit: Lives at home    Medications given during visit:  Medications   acetaminophen (Tylenol) tablet 650 mg (650 mg oral Given 2/9/24 1357)        Diagnostic/tests  Labs Reviewed   POCT GLUCOSE - Abnormal       Result Value    POCT Glucose 157 (*)       XR chest 2 views   Final Result   No acute cardiopulmonary process.             Signed by: Jayro Ceron 2/9/2024 4:06 PM   Dictation  workstation:   [id number]      XR wrist left 3+ views   Final Result   There is no evidence for acute fracture or dislocation. Mild   degenerative changes.        Signed by: Jayro Ceron 2/9/2024 4:04 PM   Dictation workstation:   [id number]      XR shoulder left 2+ views   Final Result   Minimally displaced fracture of the lateral aspect of the humeral   head/greater tuberosity, possibly extending in the humeral neck.        Signed by: Jaime Alonso 2/9/2024 3:54 PM   Dictation workstation:   [id number]      XR elbow left 3+ views   Final Result   Unremarkable left elbow radiographs.        Signed by: Jaime Alonso 2/9/2024 3:51 PM   Dictation workstation:   [id number]      CT head wo IV contrast   Final Result   No acute intracranial process. Sinus disease.        Signed by: Jaime Alonso 2/9/2024 3:46 PM   Dictation workstation:   [id number]      CT cervical spine wo IV contrast   Final Result   Cervical spondylosis without acute fracture or facet subluxation.        Signed by: Jaime Alonso 2/9/2024 3:50 PM   Dictation workstation:   [id number]           Considerations/further MDM:    This is a 73-year-old female presented emergency department after sustaining a mechanical fall.  She states that she fell on her head and left shoulder.  She denies loss of consciousness however she does take Eliquis.  Patient had mild neck pain and did not have head pain.  Due to patient falling while on a blood thinner I did proceed with CT scan of head and cervical spine without IV contrast.  CT scan of head and CT scan cervical spine with IV contrast showed no acute findings.  Patient also had significant tenderness over her left shoulder radiating down to her left elbow and wrist.  She also had tenderness over the left lateral aspect of her chest wall.  X-ray of left shoulder showed a minimally displaced fracture at the lateral aspect of the humeral head/greater tuberosity possibly extending to the humeral  neck.  X-ray of the left elbow, left wrist were negative.  Chest x-ray showed no acute cardiopulmonary process.  Patient initially presented tachycardic at 105, however I do suspect this is due to pain because his heart rate improved to the 80s prior to discharge.  The patient drove herself to the emergency department so I could not give her a narcotic pain medication here in the emergency department.  She was treated with oral Tylenol.  I did call orthopedics on-call, Dr. Mcgrath and we went over history and imaging results of the left shoulder.  He recommended patient be placed in an arm sling have her follow-up with his office early this coming up week.  A stat referral was placed to his office.  Patient was told to call his office Monday morning to schedule an appointment.  Patient states that she became lightheaded while waiting for her test results.  She states that she is diabetic and she did not eat anything today.  We did take her glucose and it was 157.  She had something to eat and drink and she said that she immediately felt better.  With walking she had no ataxia.  After placement of sling patient is still neurovascularly intact of the left upper extremity    Patient was discharged with a prescription for Norco to use for breakthrough pain.  She was told not to take this medication and drive as a cause drowsiness.  She was told to use Tylenol regularly for pain control.  She was told to ice the left shoulder 20 to 30 minutes at a time every 2-3 hours.  She was told to leave the sling on her left shoulder unless she is changing her close.  She was told to follow-up with orthopedics as discussed on her discharge paperwork.  She was told to return to the emergency department medially with new or worsening symptoms with the onset of new symptoms.  Patient was released in good condition.    Procedure  Procedures     Lacy Guy PA-C  02/09/24 2042

## 2024-02-27 ENCOUNTER — TELEPHONE (OUTPATIENT)
Dept: CARDIOLOGY | Facility: CLINIC | Age: 74
End: 2024-02-27
Payer: COMMERCIAL

## 2024-02-28 DIAGNOSIS — F41.9 ANXIETY: ICD-10-CM

## 2024-02-28 RX ORDER — DULOXETIN HYDROCHLORIDE 60 MG/1
60 CAPSULE, DELAYED RELEASE ORAL DAILY
Qty: 90 CAPSULE | Refills: 0 | Status: SHIPPED | OUTPATIENT
Start: 2024-02-28 | End: 2024-05-29

## 2024-02-29 NOTE — TELEPHONE ENCOUNTER
Called patient to discuss Eliquis in the setting of nosebleeds.  No answer.  Left her a message and we will plan on discussing other options when I see her in the office.  Ultimately I cannot recommend reducing the dose as she is still getting the risk of bleeding with an unclear reduction in risk of stroke.  Will follow-up.

## 2024-03-11 DIAGNOSIS — I10 ESSENTIAL (PRIMARY) HYPERTENSION: ICD-10-CM

## 2024-03-14 RX ORDER — LISINOPRIL 20 MG/1
20 TABLET ORAL DAILY
Qty: 90 TABLET | Refills: 1 | Status: SHIPPED | OUTPATIENT
Start: 2024-03-14

## 2024-04-03 ENCOUNTER — OFFICE VISIT (OUTPATIENT)
Dept: PRIMARY CARE | Facility: CLINIC | Age: 74
End: 2024-04-03
Payer: COMMERCIAL

## 2024-04-03 VITALS
BODY MASS INDEX: 33.8 KG/M2 | DIASTOLIC BLOOD PRESSURE: 62 MMHG | HEIGHT: 64 IN | HEART RATE: 112 BPM | WEIGHT: 198 LBS | TEMPERATURE: 97.4 F | OXYGEN SATURATION: 94 % | SYSTOLIC BLOOD PRESSURE: 104 MMHG

## 2024-04-03 DIAGNOSIS — E11.9 TYPE 2 DIABETES MELLITUS WITHOUT COMPLICATION, UNSPECIFIED WHETHER LONG TERM INSULIN USE (MULTI): ICD-10-CM

## 2024-04-03 LAB — POC HEMOGLOBIN A1C: 9.1 % (ref 4.2–6.5)

## 2024-04-03 PROCEDURE — 3074F SYST BP LT 130 MM HG: CPT

## 2024-04-03 PROCEDURE — 3008F BODY MASS INDEX DOCD: CPT

## 2024-04-03 PROCEDURE — 83036 HEMOGLOBIN GLYCOSYLATED A1C: CPT

## 2024-04-03 PROCEDURE — 1036F TOBACCO NON-USER: CPT

## 2024-04-03 PROCEDURE — 1160F RVW MEDS BY RX/DR IN RCRD: CPT

## 2024-04-03 PROCEDURE — 99214 OFFICE O/P EST MOD 30 MIN: CPT

## 2024-04-03 PROCEDURE — 1159F MED LIST DOCD IN RCRD: CPT

## 2024-04-03 PROCEDURE — 1126F AMNT PAIN NOTED NONE PRSNT: CPT

## 2024-04-03 PROCEDURE — 4010F ACE/ARB THERAPY RXD/TAKEN: CPT

## 2024-04-03 PROCEDURE — 3078F DIAST BP <80 MM HG: CPT

## 2024-04-03 ASSESSMENT — PATIENT HEALTH QUESTIONNAIRE - PHQ9
SUM OF ALL RESPONSES TO PHQ9 QUESTIONS 1 AND 2: 2
1. LITTLE INTEREST OR PLEASURE IN DOING THINGS: SEVERAL DAYS
2. FEELING DOWN, DEPRESSED OR HOPELESS: SEVERAL DAYS
10. IF YOU CHECKED OFF ANY PROBLEMS, HOW DIFFICULT HAVE THESE PROBLEMS MADE IT FOR YOU TO DO YOUR WORK, TAKE CARE OF THINGS AT HOME, OR GET ALONG WITH OTHER PEOPLE: SOMEWHAT DIFFICULT

## 2024-04-03 ASSESSMENT — ENCOUNTER SYMPTOMS
WEAKNESS: 0
ACTIVITY CHANGE: 0
POLYPHAGIA: 0
DIZZINESS: 0
FATIGUE: 0
LIGHT-HEADEDNESS: 0
PALPITATIONS: 0
COUGH: 0
APPETITE CHANGE: 0
POLYDIPSIA: 0
VOMITING: 0
CHEST TIGHTNESS: 0
ABDOMINAL PAIN: 0
WHEEZING: 0
CONSTIPATION: 0
DIARRHEA: 0
NAUSEA: 0

## 2024-04-03 ASSESSMENT — PAIN SCALES - GENERAL: PAINLEVEL: 0-NO PAIN

## 2024-04-03 NOTE — PROGRESS NOTES
"Subjective   Patient ID: Kristi Jackson is a 73 y.o. female who presents for Diabetes.    HPI   Interval concerns:  Fell about 6 weeks ago while picking up sticks.  Injured her left shoulder.  Seeing Dr. Mcgrath.  Left arm is currently in a sling for support. Taking tylenol for pain, which she admits is not always helpful.  Has follow-up with Dr. Mcgrath on Friday, for 4/5/2024.      T2DM:  Current medication regimen:   -Trulicity 1.5 mg-- missing injection because she forgets to take it, has not taken in the last 6 weeks.   -Metformin XR 1000mg BID  Tolerating current medication regimen yes  Blood sugars at home averaging 170-222's. Checks every morning, but sometimes in the afternoons  CGM? no - not covered by insurance  Hypoglycemic episodes no  Last A1C : 9.1 (POC 4/3/2024), 7.5 (POC 1/03/24), 7.1, 5.9   Last eGFR - 95  Last Creatinine - 0.6  Last microalbumin -  Up to date on yearly eye exams yes  -Last eye exam - 12/2023 Dr. Cavanaugh  Follows with podiatry no  - Statin? Yes - known ASCVD  - ACE-I/ARB? Yes  - Aspirin? No  - Last diabetic foot exam:  - Pneumococcal vaccine? Done  RSV, covid, flu done    Review of Systems   Constitutional:  Negative for activity change, appetite change and fatigue.   Eyes:  Negative for visual disturbance.   Respiratory:  Negative for cough, chest tightness and wheezing.    Cardiovascular:  Negative for chest pain and palpitations.   Gastrointestinal:  Negative for abdominal pain, constipation, diarrhea, nausea and vomiting.   Endocrine: Negative for polydipsia, polyphagia and polyuria.   Neurological:  Negative for dizziness, weakness and light-headedness.         Objective   /62 (BP Location: Left arm)   Pulse (!) 112   Temp 36.3 °C (97.4 °F) (Temporal)   Ht 1.626 m (5' 4\")   Wt 89.8 kg (198 lb)   SpO2 94%   BMI 33.99 kg/m²     Physical Exam  Vitals and nursing note reviewed.   Constitutional:       General: She is not in acute distress.     Appearance: Normal " appearance. She is obese.   Cardiovascular:      Rate and Rhythm: Normal rate and regular rhythm.      Heart sounds: Normal heart sounds. No murmur heard.  Pulmonary:      Effort: Pulmonary effort is normal.      Breath sounds: Normal breath sounds.   Abdominal:      General: Bowel sounds are normal. There is no distension.      Palpations: Abdomen is soft.   Musculoskeletal:      Right lower leg: No edema.      Left lower leg: No edema.   Skin:     General: Skin is warm and dry.      Capillary Refill: Capillary refill takes less than 2 seconds.   Neurological:      General: No focal deficit present.      Mental Status: She is alert and oriented to person, place, and time. Mental status is at baseline.   Psychiatric:         Behavior: Behavior is cooperative.         Assessment/Plan   Problem List Items Addressed This Visit             ICD-10-CM    Type 2 diabetes mellitus without complications (CMS/HCC)  Chronic condition, not well-controlled.  A1c at this visit 9.1%.  Patient admits she is not taking Trulicity as prescribed, has not taken medication in at least 6 weeks.  We discussed since she has been off Trulicity for 6 weeks we would need to restart at the initial dose.  Since we need to restart at the initial dose we discussed switching to Ozempic, patient is agreeable if insurance will cover it.  Placed order for Ozempic 0.25 mg to be sent to her pharmacy, will follow-up with prior authorization.  We also discussed interventions for her to remember to take her injections once a week.  She will put a reminder in her phone, and briana her calendar for weekly reminders.  Thing is to take her metformin 1,000 mg twice daily as prescribed.  She will need to follow-up with Sybil Smith [facility], diabetic educator in 1 month as discussed for further diabetic education and diet education. E11.9    Relevant Medications    semaglutide 0.25 mg or 0.5 mg (2 mg/3 mL) pen injector    Other Relevant Orders    POCT glycosylated  hemoglobin (Hb A1C) manually resulted (Completed)     ** Disclaimer: Parts of this document were written utilizing a voice to text dictation software. Note may contain minor transcription or typographical errors that were inadvertently transcribed by the computer software.

## 2024-04-24 ENCOUNTER — OFFICE VISIT (OUTPATIENT)
Dept: OTOLARYNGOLOGY | Facility: CLINIC | Age: 74
End: 2024-04-24
Payer: COMMERCIAL

## 2024-04-24 VITALS — WEIGHT: 197 LBS | BODY MASS INDEX: 33.63 KG/M2 | TEMPERATURE: 96.8 F | HEIGHT: 64 IN

## 2024-04-24 DIAGNOSIS — R04.0 EPISTAXIS: Primary | ICD-10-CM

## 2024-04-24 DIAGNOSIS — J34.2 DEVIATED NASAL SEPTUM: ICD-10-CM

## 2024-04-24 DIAGNOSIS — D68.9 MEDICATION INDUCED COAGULOPATHY (MULTI): ICD-10-CM

## 2024-04-24 DIAGNOSIS — T50.905A MEDICATION INDUCED COAGULOPATHY (MULTI): ICD-10-CM

## 2024-04-24 PROCEDURE — 4010F ACE/ARB THERAPY RXD/TAKEN: CPT | Performed by: OTOLARYNGOLOGY

## 2024-04-24 PROCEDURE — 30903 CONTROL OF NOSEBLEED: CPT | Performed by: OTOLARYNGOLOGY

## 2024-04-24 PROCEDURE — 1036F TOBACCO NON-USER: CPT | Performed by: OTOLARYNGOLOGY

## 2024-04-24 PROCEDURE — 99203 OFFICE O/P NEW LOW 30 MIN: CPT | Performed by: OTOLARYNGOLOGY

## 2024-04-24 PROCEDURE — 1160F RVW MEDS BY RX/DR IN RCRD: CPT | Performed by: OTOLARYNGOLOGY

## 2024-04-24 PROCEDURE — 1159F MED LIST DOCD IN RCRD: CPT | Performed by: OTOLARYNGOLOGY

## 2024-04-24 PROCEDURE — 3008F BODY MASS INDEX DOCD: CPT | Performed by: OTOLARYNGOLOGY

## 2024-04-24 RX ORDER — BISMUTH SUBSALICYLATE 262 MG
1 TABLET,CHEWABLE ORAL DAILY
Status: ON HOLD | COMMUNITY
End: 2024-06-05 | Stop reason: ALTCHOICE

## 2024-04-24 NOTE — PROGRESS NOTES
Chief Complaint   Patient presents with    New Patient Visit     N/P   left sided nosebleeds on and off since Jan. Has gotten worse since she fell and hit that side of her face     HPI:  Kristi Jackson is a 73 y.o. female who is on Eliquis after having a cardiac stent placed 1 year ago has been having some left-sided epistaxis about every other day for the past 3 months.  Get stopped with direct pinching pressure.    PMH:  History reviewed. No pertinent past medical history.  Past Surgical History:   Procedure Laterality Date    MR HEAD ANGIO WO IV CONTRAST  6/3/2021    MR HEAD ANGIO WO IV CONTRAST [facility]         Medications:     Current Outpatient Medications:     apixaban (Eliquis) 5 mg tablet, TAKE 1 TABLET BY MOUTH TWO TIMES A DAY, Disp: 60 tablet, Rfl: 6    DULoxetine (Cymbalta) 60 mg DR capsule, TAKE 1 CAPSULE DAILY, Disp: 90 capsule, Rfl: 0    lisinopril 20 mg tablet, TAKE 1 TABLET DAILY, Disp: 90 tablet, Rfl: 1    metFORMIN (Glucophage) 1,000 mg tablet, TAKE 1 TABLET TWICE A DAY, Disp: 180 tablet, Rfl: 3    metoprolol tartrate (Lopressor) 25 mg tablet, , Disp: , Rfl:     multivitamin tablet, Take 1 tablet by mouth once daily., Disp: , Rfl:     nitroglycerin (Nitrostat) 0.4 mg SL tablet, Instructions: PLACE 1 TABLET UNDER THE TONGUE AS NEEDED FOR CHEST PAIN. MAY REPEAT EVERY FIVE MINUTES X 3., Disp: , Rfl:     OneTouch Verio test strips strip, USE TO TEST SUGAR THREE TIMES A DAY, Disp: 300 strip, Rfl: 3    rosuvastatin (Crestor) 10 mg tablet, Take 1 tablet (10 mg) by mouth once daily at bedtime., Disp: , Rfl:     clopidogrel (Plavix) 75 mg tablet, Take 1 tablet (75 mg) by mouth once daily., Disp: , Rfl:     dulaglutide (Trulicity) 1.5 mg/0.5 mL pen injector injection, Inject 1.5 mg under the skin 1 (one) time per week., Disp: 6 mL, Rfl: 0    folic acid/multivit-min/lutein (CENTRUM SILVER ORAL), Take 1 tablet by mouth once daily., Disp: , Rfl:     HYDROcodone-acetaminophen (Norco) 5-325 mg  "tablet, Take 1 tablet by mouth every 6 hours if needed for severe pain (7 - 10). (Patient not taking: Reported on 4/24/2024), Disp: 10 tablet, Rfl: 0    semaglutide 0.25 mg or 0.5 mg (2 mg/3 mL) pen injector, Inject 0.25 mg under the skin 1 (one) time per week., Disp: 3 mL, Rfl: 0     Allergies:  No Known Allergies     ROS:  Review of systems normal unless stated otherwise in the HPI and/or PMH.    Physical Exam:  Temperature 36 °C (96.8 °F), height 1.626 m (5' 4\"), weight 89.4 kg (197 lb). Body mass index is 33.81 kg/m².     GENERAL APPEARANCE: Well developed and well nourished.  Alert and oriented in no acute distress.  Normal vocal quality.      HEAD/FACE: No erythema or edema or facial tenderness.  Normal facial nerve function bilaterally.    EAR:       EXTERNAL: Normal pinnas and external auditory canals without lesion or obstructing wax.       MIDDLE EAR: Tympanic membranes intact and mobile with normal landmarks.  Middle ear space appears well aerated.       TUBE STATUS: N/A       MASTOID CAVITY: N/A       HEARING: Gross hearing assessment is within normal limits.      NOSE:       VISUALIZED USING: Anterior rhinoscopy with headlight and nasal speculum.       DORSUM: Midline, nontraumatic appearance.       MUCOSA: Normal-appearing.       SECRETIONS: Normal.       SEPTUM: Mild deviation       INFERIOR TURBINATES: Normal.       MIDDLE TURBINATES/MEATUS: N/A       BLEEDING: Source seen left he is a box plexus         ORAL CAVITY/PHARYNX:       TEETH: Adequate dentition.       TONGUE: No mass or lesion.  Normal mobility.       FLOOR OF MOUTH: No mass or lesion.       PALATE: Normal hard palate, soft palate, and uvula.       OROPHARYNX: Normal without mass or lesion.       BUCCAL MUCOSA/GBS: Normal without mass or lesion.       LIPS: Normal.    LARYNX/HYPOPHARYNX/NASOPHARYNX: N/A    NECK: No palpable masses or abnormal adenopathy.  Trachea is midline.    THYROID: No thyromegaly or palpable nodule.    SALIVARY " GLANDS: Normal bilateral parotid and submandibular glands by inspection and palpation.    TMJ's: Normal.    NEURO: Cranial nerve exam grossly normal bilaterally.     Procedure:  Nasal speculum and headlight was used to identify source of bleeding at the left Kiesselbach's plexus.  After consent and topical anesthesia this area was cauterized with silver nitrate.  Bacitracin ointment was then applied.  There were no complications.    Assessment/Plan   Kristi was seen today for new patient visit.  Diagnoses and all orders for this visit:  Epistaxis (Primary)  Deviated nasal septum  Medication induced coagulopathy (Multi)     Discussed with her cauterization today as this has been ongoing for several months.  She is aware the procedure including the risks such as but not limited to further bleeding and infection and possibly requiring nasal packing.  This was done without any complication.  Instructed over the next week on using an antibiotic ointment twice a day as well as no lifting, bending, blowing, picking, snorting.  Follow up if symptoms worsen or fail to improve.     Felipe Castillo MD

## 2024-05-03 ENCOUNTER — APPOINTMENT (OUTPATIENT)
Dept: PRIMARY CARE | Facility: CLINIC | Age: 74
End: 2024-05-03
Payer: COMMERCIAL

## 2024-05-10 ENCOUNTER — TELEPHONE (OUTPATIENT)
Dept: CARDIOLOGY | Facility: CLINIC | Age: 74
End: 2024-05-10
Payer: COMMERCIAL

## 2024-05-10 NOTE — TELEPHONE ENCOUNTER
Patient called the office today stating that she has new insurance.  New insurance won't cover eliquis and she can't afford to pay for it each month.  Please advise, thanks  Call back [phone]

## 2024-05-25 ENCOUNTER — APPOINTMENT (OUTPATIENT)
Dept: RADIOLOGY | Facility: HOSPITAL | Age: 74
End: 2024-05-25
Payer: COMMERCIAL

## 2024-05-25 ENCOUNTER — HOSPITAL ENCOUNTER (EMERGENCY)
Facility: HOSPITAL | Age: 74
Discharge: HOME | End: 2024-05-25
Payer: COMMERCIAL

## 2024-05-25 VITALS
HEART RATE: 99 BPM | WEIGHT: 197.31 LBS | RESPIRATION RATE: 15 BRPM | BODY MASS INDEX: 33.69 KG/M2 | OXYGEN SATURATION: 98 % | TEMPERATURE: 98.4 F | DIASTOLIC BLOOD PRESSURE: 74 MMHG | SYSTOLIC BLOOD PRESSURE: 135 MMHG | HEIGHT: 64 IN

## 2024-05-25 DIAGNOSIS — S42.401A CLOSED FRACTURE DISLOCATION OF RIGHT ELBOW, INITIAL ENCOUNTER: Primary | ICD-10-CM

## 2024-05-25 DIAGNOSIS — S09.90XA CLOSED HEAD INJURY, INITIAL ENCOUNTER: ICD-10-CM

## 2024-05-25 PROCEDURE — 73080 X-RAY EXAM OF ELBOW: CPT | Mod: RIGHT SIDE | Performed by: RADIOLOGY

## 2024-05-25 PROCEDURE — 72125 CT NECK SPINE W/O DYE: CPT

## 2024-05-25 PROCEDURE — 70450 CT HEAD/BRAIN W/O DYE: CPT | Performed by: RADIOLOGY

## 2024-05-25 PROCEDURE — 70450 CT HEAD/BRAIN W/O DYE: CPT

## 2024-05-25 PROCEDURE — 72125 CT NECK SPINE W/O DYE: CPT | Performed by: RADIOLOGY

## 2024-05-25 PROCEDURE — 73080 X-RAY EXAM OF ELBOW: CPT | Mod: RT

## 2024-05-25 PROCEDURE — 99285 EMERGENCY DEPT VISIT HI MDM: CPT

## 2024-05-25 RX ORDER — OXYCODONE AND ACETAMINOPHEN 5; 325 MG/1; MG/1
1 TABLET ORAL EVERY 6 HOURS PRN
Qty: 16 TABLET | Refills: 0 | Status: SHIPPED | OUTPATIENT
Start: 2024-05-25 | End: 2024-06-05 | Stop reason: HOSPADM

## 2024-05-25 RX ORDER — ACETAMINOPHEN 325 MG/1
650 TABLET ORAL ONCE
Status: COMPLETED | OUTPATIENT
Start: 2024-05-25 | End: 2024-05-25

## 2024-05-25 RX ADMIN — ACETAMINOPHEN 650 MG: 325 TABLET ORAL at 13:48

## 2024-05-25 ASSESSMENT — PAIN SCALES - GENERAL: PAINLEVEL_OUTOF10: 9

## 2024-05-25 ASSESSMENT — PAIN - FUNCTIONAL ASSESSMENT
PAIN_FUNCTIONAL_ASSESSMENT: 0-10
PAIN_FUNCTIONAL_ASSESSMENT: 0-10

## 2024-05-25 NOTE — ED PROVIDER NOTES
HPI   Chief Complaint   Patient presents with    Fall     Pt was reaching up to get something off the shelf at [facility]. Pt states she lost her balance and fell backwards hitting her head and right elbow. Pt has abrasion to back of head and swelling to right elbow. Pt states she is on Eliquis.        HPI  See my MDM                  Sotero Coma Scale Score: 15                     Patient History   History reviewed. No pertinent past medical history.  Past Surgical History:   Procedure Laterality Date    MR HEAD ANGIO WO IV CONTRAST  6/3/2021    MR HEAD ANGIO WO IV CONTRAST [facility]     No family history on file.  Social History     Tobacco Use    Smoking status: Never    Smokeless tobacco: Never   Vaping Use    Vaping status: Never Used   Substance Use Topics    Alcohol use: Yes    Drug use: Never       Physical Exam   ED Triage Vitals [05/25/24 1336]   Temperature Heart Rate Respirations BP   36.9 °C (98.4 °F) 99 15 135/74      Pulse Ox Temp Source Heart Rate Source Patient Position   98 % Oral Monitor Lying      BP Location FiO2 (%)     Left arm --       Physical Exam  CONSTITUTIONAL: Vital signs reviewed as charted, well-developed and in no distress  Eyes: Extraocular muscles are intact. Pupils equal round and reactive to light. Conjunctiva are pink.    ENT: Mucous membranes are moist. Tongue in the midline. Pharynx was without erythema or exudates, uvula midline  LUNGS: Breath sounds equal and clear to auscultation. Good air exchange, no wheezes rales or retractions, pulse oximetry is charted.  HEART: Regular rate and rhythm without murmur thrill or rub, strong tones, auscultation is normal.  ABDOMEN: Soft and nontender without guarding rebound rigidity or mass. Bowel sounds are present and normal in all quadrants. There is no palpable masses or aneurysms identified. No hepatosplenomegaly, normal abdominal exam.  Neuro: The patient is awake, alert and oriented ×3. Moving all 4 extremities and  answering questions appropriately.   MUSCULOSKELETAL: Examination of the right elbow shows a large swelling over the olecranon bursa, tenderness to palpation of the bony structures.  No obvious deformity.  Motor sensation pulses are intact.  PSYCH: Awake alert oriented, normal mood and affect.  Skin:  Dry, normal color, warm to the touch, no rash present.  Small abrasion present to the right posterior aspect of the head.    ED Course & MDM   Diagnoses as of 05/25/24 1558   Closed fracture dislocation of right elbow, initial encounter   Closed head injury, initial encounter       Medical Decision Making  History obtained from: patient    Vital signs, nursing notes, current medications, past medical history, Surgical history, allergies, social history, family History were reviewed.         HPI:  Patient 73-year-old female who was supposed to be taking Eliquis but has not taken in 2 weeks presenting to ED today for evaluation of head injury and right elbow injury.  States he was at [employer] going to get something off the shelf fell backwards hitting her head and right elbow on the ground.  Does have large swelling in the olecranon bursa area.  Denies loss of consciousness.  Denies dizziness, chest pain, shortness of breath, abdominal pain extremity edema.  States she does not even have a headache.  Main complaint is the right elbow.      10 point ROS was reviewed and negative except Noted above in HPI.  DDX: as listed above          MDM Summary/considerations:  EMERGENCY DEPARTMENT COURSE and DIFFERENTIAL DIAGNOSIS/MDM:    The patient presented with a chief complaint of fall, right elbow and head injury. The differential diagnosis associated with this patient's presentation includes fracture, contusion, looking on bursitis, intracranial hemorrhage.     Vitals:    Vitals:    05/25/24 1336   BP: 135/74   BP Location: Left arm   Patient Position: Lying   Pulse: 99   Resp: 15   Temp: 36.9 °C (98.4 °F)   TempSrc: Oral  "  SpO2: 98%   Weight: 89.5 kg (197 lb 5 oz)   Height: 1.626 m (5' 4\")       Diagnoses as of 05/25/24 1558   Closed fracture dislocation of right elbow, initial encounter   Closed head injury, initial encounter       History Limited by:    None    Independent history obtained from:    EMS    External records reviewed:    None    Diagnostics interpreted by me:    Xray(s) of the right elbow and CT Scan(s) of the head and neck    Discussions with other clinicians:    None    Chronic conditions impacting care:    None    Social determinants of health affecting care:    None    Diagnostic tests considered but not performed: none    ED Medications managed:    Medications   acetaminophen (Tylenol) tablet 650 mg (650 mg oral Given 5/25/24 1348)       Prescription drugs considered:    Pain Medications oxycodone    Screenings:          Labs Reviewed - No data to display  XR elbow right 3+ views   Final Result   Acute olecranon fracture with displacement and distraction of the   fracture fragments by a distance of 1-1/2 cm with large soft tissue   hematoma demonstrated as well. Joint effusion is observed.        Signed by: Sandrita Valladares 5/25/2024 2:37 PM   Dictation workstation:   [id number]      CT head wo IV contrast   Final Result   No evidence of acute cortical infarct or intracranial hemorrhage.        Mild age-appropriate atrophy.        Chronic left maxillary sinusitis which may be the result of fungal   infection.        MACRO:   None             Signed by: Sandrita Valladares 5/25/2024 2:29 PM   Dictation workstation:   [id number]      CT cervical spine wo IV contrast   Final Result   No fracture or dislocation of cervical spine.        Degenerative disc disease at C4-5, C5-6, and C6-7 levels with   cervical spondylosis.        Bone island right pedicle of T3.        MACRO:   None        Signed by: Sandrita Valladares 5/25/2024 2:25 PM   Dictation workstation:   [id number]        Medications   acetaminophen (Tylenol) tablet 650 mg " (650 mg oral Given 5/25/24 1348)     Discharge Medication List as of 5/25/2024  3:10 PM        START taking these medications    Details   oxyCODONE-acetaminophen (Percocet) 5-325 mg tablet Take 1 tablet by mouth every 6 hours if needed for severe pain (7 - 10) for up to 4 days., Starting Sat 5/25/2024, Until Wed 5/29/2024 at 2359, Normal           I estimate there is LOW risk for COMPARTMENT SYNDROME, DEEP VENOUS THROMBOSIS, SEPTIC ARTHRITIS, TENDON OR NEUROVASCULAR INJURY, thus I consider the discharge disposition reasonable. We have discussed the diagnosis and risks, and we agree with discharging home to follow-up with their primary doctor or the referral orthopedist. We also discussed returning to the Emergency Department immediately if new or worsening symptoms occur. We have discussed the symptoms which aremost concerning (e.g., changing or worsening pain, numbness, weakness) that necessitates immediate return.    Patient's x-ray shows a displaced olecranon fracture, was placed into a long-arm posterior splint fitted with a sling all by nursing staff.  Will discharge home to follow-up with orthopedics for likely surgical intervention.  Short supply of pain medication 4 days worth were sent to the pharmacy given the holiday weekend.  She was discharged home stable condition.    I was present for splint application. Post splint application, the patient was neurologically intact, motor and sensation were intact. Cap refill is less than 3 seconds.    All of the patient's questions were answered to the best of my ability.  Patient states understanding that they have been screened for an emergency today and we have not found any etiology of symptoms that requires emergent treatment or admission to the hospital at this point. They understand that they have not had definitive care day and require follow-up for treatment of their condition. They also state understanding that they may have an emergent condition that may  potentially have not of detected at this visit and they must return to the emergency department if they develop any worsening of symptoms or new complaints.      I have evaluated this patient, my supervising physician was available for consultation.            Critical Care: Not warranted at this time        This chart was completed using voice recognition transcription software. Please excuse any errors of transcription including grammatical, punctuation, syntax and spelling errors.  Please contact me with any questions regarding this chart.    Procedure  Procedures     MANDO Hathaway-CNP  05/25/24 1559

## 2024-05-28 DIAGNOSIS — F41.9 ANXIETY: ICD-10-CM

## 2024-05-29 RX ORDER — DULOXETIN HYDROCHLORIDE 60 MG/1
60 CAPSULE, DELAYED RELEASE ORAL DAILY
Qty: 90 CAPSULE | Refills: 3 | Status: SHIPPED | OUTPATIENT
Start: 2024-05-29

## 2024-05-31 ENCOUNTER — PHARMACY VISIT (OUTPATIENT)
Dept: PHARMACY | Facility: CLINIC | Age: 74
End: 2024-05-31
Payer: COMMERCIAL

## 2024-05-31 ENCOUNTER — OFFICE VISIT (OUTPATIENT)
Dept: CARDIOLOGY | Facility: CLINIC | Age: 74
End: 2024-05-31
Payer: COMMERCIAL

## 2024-05-31 VITALS
BODY MASS INDEX: 33.3 KG/M2 | WEIGHT: 194 LBS | OXYGEN SATURATION: 98 % | SYSTOLIC BLOOD PRESSURE: 140 MMHG | HEART RATE: 120 BPM | DIASTOLIC BLOOD PRESSURE: 78 MMHG

## 2024-05-31 DIAGNOSIS — Z01.810 PREOPERATIVE CARDIOVASCULAR EXAMINATION: Primary | ICD-10-CM

## 2024-05-31 DIAGNOSIS — I48.20 CHRONIC ATRIAL FIBRILLATION (MULTI): ICD-10-CM

## 2024-05-31 PROCEDURE — RXMED WILLOW AMBULATORY MEDICATION CHARGE

## 2024-05-31 PROCEDURE — 93005 ELECTROCARDIOGRAM TRACING: CPT | Performed by: INTERNAL MEDICINE

## 2024-05-31 PROCEDURE — 1125F AMNT PAIN NOTED PAIN PRSNT: CPT | Performed by: INTERNAL MEDICINE

## 2024-05-31 PROCEDURE — 4010F ACE/ARB THERAPY RXD/TAKEN: CPT | Performed by: INTERNAL MEDICINE

## 2024-05-31 PROCEDURE — 3078F DIAST BP <80 MM HG: CPT | Performed by: INTERNAL MEDICINE

## 2024-05-31 PROCEDURE — 3008F BODY MASS INDEX DOCD: CPT | Performed by: INTERNAL MEDICINE

## 2024-05-31 PROCEDURE — 3077F SYST BP >= 140 MM HG: CPT | Performed by: INTERNAL MEDICINE

## 2024-05-31 PROCEDURE — 93010 ELECTROCARDIOGRAM REPORT: CPT | Performed by: INTERNAL MEDICINE

## 2024-05-31 PROCEDURE — 99214 OFFICE O/P EST MOD 30 MIN: CPT | Performed by: INTERNAL MEDICINE

## 2024-05-31 ASSESSMENT — PAIN SCALES - GENERAL: PAINLEVEL: 8

## 2024-05-31 NOTE — ASSESSMENT & PLAN NOTE
Doing well from a cardiac standpoint without recurrent angina, in the setting of her previous circumflex drug-eluting stent deployment June 2023.  She has no signs of heart failure, no volume overload, no significant valvular heart disease and no unstable arrhythmias.    Her persistent atrial fibrillation is a chronic finding for as documented on her EKG today.    She has been off of Eliquis for over 3 weeks because she is waiting for her insurance company to approve her new insurance plan so this can be affordable for her.  I suggested that she get at least 2 weeks worth of Eliquis from her [facility] retail pharmacy so that she can resume the Eliquis postoperatively when her orthopedic surgeon feels bleeding risk is acceptable.    She can be off of her clopidogrel for 5 days preoperatively if necessary but then should resume her clopidogrel postoperatively when her orthopedic surgeon feels that is acceptable as well

## 2024-05-31 NOTE — PROGRESS NOTES
Subjective      Chief Complaint   Patient presents with    SC June 5        Here for preoperative cardiovascular risk assessment before undergoing open reduction and internal fixation of her right olecranon.  She has a stable normal functional capacity with no angina and no signs of heart failure or shortness of breath.    Previous: Previous history of cardiac catheterization 2017 showing moderate nonobstructive LAD disease.  She then presented June 2023 with non-STEMI and atrial fibrillation which was treated with a rate control strategy.  She had cardiac catheterization which showed a mid circumflex occlusion with drug-eluting stent deployed which was a 2.25/28 mm stent, not able to be postdilated but good angiographic appearance.  LV systolic function was normal.             Review of Systems   All other systems reviewed and are negative.       Objective   Physical Exam  Constitutional:       Appearance: Normal appearance.   HENT:      Head: Normocephalic and atraumatic.   Eyes:      Pupils: Pupils are equal, round, and reactive to light.   Cardiovascular:      Rate and Rhythm: Normal rate. Rhythm irregular.      Pulses: Normal pulses.      Heart sounds: Normal heart sounds.   Pulmonary:      Effort: Pulmonary effort is normal.      Breath sounds: Normal breath sounds.   Abdominal:      General: Abdomen is flat. Bowel sounds are normal.      Palpations: Abdomen is soft.   Musculoskeletal:         General: Signs of injury present. Normal range of motion.      Cervical back: Normal range of motion.      Comments: Right arm is in a cast/sling   Skin:     General: Skin is warm and dry.   Neurological:      General: No focal deficit present.   Psychiatric:         Mood and Affect: Mood normal.         Judgment: Judgment normal.          Lab Review:   Not applicable    CAD (coronary artery disease)  Doing well from a cardiac standpoint without recurrent angina, in the setting of her previous circumflex drug-eluting  stent deployment June 2023.  She has no signs of heart failure, no volume overload, no significant valvular heart disease and no unstable arrhythmias.    Her persistent atrial fibrillation is a chronic finding for as documented on her EKG today.    She has been off of Eliquis for over 3 weeks because she is waiting for her insurance company to approve her new insurance plan so this can be affordable for her.  I suggested that she get at least 2 weeks worth of Eliquis from her [facility] retail pharmacy so that she can resume the Eliquis postoperatively when her orthopedic surgeon feels bleeding risk is acceptable.    She can be off of her clopidogrel for 5 days preoperatively if necessary but then should resume her clopidogrel postoperatively when her orthopedic surgeon feels that is acceptable as well

## 2024-06-03 PROCEDURE — RXMED WILLOW AMBULATORY MEDICATION CHARGE

## 2024-06-04 ENCOUNTER — PHARMACY VISIT (OUTPATIENT)
Dept: PHARMACY | Facility: CLINIC | Age: 74
End: 2024-06-04
Payer: COMMERCIAL

## 2024-06-05 ENCOUNTER — PHARMACY VISIT (OUTPATIENT)
Dept: PHARMACY | Facility: CLINIC | Age: 74
End: 2024-06-05
Payer: COMMERCIAL

## 2024-06-05 ENCOUNTER — APPOINTMENT (OUTPATIENT)
Dept: RADIOLOGY | Facility: HOSPITAL | Age: 74
End: 2024-06-05
Payer: COMMERCIAL

## 2024-06-05 ENCOUNTER — HOSPITAL ENCOUNTER (OUTPATIENT)
Facility: HOSPITAL | Age: 74
Setting detail: OUTPATIENT SURGERY
Discharge: HOME | End: 2024-06-05
Attending: ORTHOPAEDIC SURGERY | Admitting: ORTHOPAEDIC SURGERY
Payer: COMMERCIAL

## 2024-06-05 ENCOUNTER — ANESTHESIA (OUTPATIENT)
Dept: OPERATING ROOM | Facility: HOSPITAL | Age: 74
End: 2024-06-05
Payer: COMMERCIAL

## 2024-06-05 ENCOUNTER — ANESTHESIA EVENT (OUTPATIENT)
Dept: OPERATING ROOM | Facility: HOSPITAL | Age: 74
End: 2024-06-05
Payer: COMMERCIAL

## 2024-06-05 VITALS
DIASTOLIC BLOOD PRESSURE: 73 MMHG | HEART RATE: 120 BPM | RESPIRATION RATE: 16 BRPM | OXYGEN SATURATION: 94 % | TEMPERATURE: 97.7 F | SYSTOLIC BLOOD PRESSURE: 134 MMHG

## 2024-06-05 DIAGNOSIS — S52.021A CLOSED FRACTURE OF OLECRANON PROCESS OF RIGHT ULNA, INITIAL ENCOUNTER: Primary | ICD-10-CM

## 2024-06-05 LAB
ANION GAP SERPL CALC-SCNC: 11 MMOL/L
BUN SERPL-MCNC: 13 MG/DL (ref 8–25)
CALCIUM SERPL-MCNC: 8.8 MG/DL (ref 8.5–10.4)
CHLORIDE SERPL-SCNC: 102 MMOL/L (ref 97–107)
CO2 SERPL-SCNC: 21 MMOL/L (ref 24–31)
CREAT SERPL-MCNC: 0.6 MG/DL (ref 0.4–1.6)
EGFRCR SERPLBLD CKD-EPI 2021: >90 ML/MIN/1.73M*2
ERYTHROCYTE [DISTWIDTH] IN BLOOD BY AUTOMATED COUNT: 12.2 % (ref 11.5–14.5)
GLUCOSE SERPL-MCNC: 297 MG/DL (ref 65–99)
HCT VFR BLD AUTO: 38.8 % (ref 36–46)
HGB BLD-MCNC: 13.4 G/DL (ref 12–16)
MCH RBC QN AUTO: 31.8 PG (ref 26–34)
MCHC RBC AUTO-ENTMCNC: 34.5 G/DL (ref 32–36)
MCV RBC AUTO: 92 FL (ref 80–100)
NRBC BLD-RTO: 0 /100 WBCS (ref 0–0)
PLATELET # BLD AUTO: 266 X10*3/UL (ref 150–450)
POTASSIUM SERPL-SCNC: 4.2 MMOL/L (ref 3.4–5.1)
RBC # BLD AUTO: 4.22 X10*6/UL (ref 4–5.2)
SODIUM SERPL-SCNC: 134 MMOL/L (ref 133–145)
WBC # BLD AUTO: 7.5 X10*3/UL (ref 4.4–11.3)

## 2024-06-05 PROCEDURE — 85027 COMPLETE CBC AUTOMATED: CPT | Performed by: ORTHOPAEDIC SURGERY

## 2024-06-05 PROCEDURE — 7100000010 HC PHASE TWO TIME - EACH INCREMENTAL 1 MINUTE: Performed by: ORTHOPAEDIC SURGERY

## 2024-06-05 PROCEDURE — 2500000004 HC RX 250 GENERAL PHARMACY W/ HCPCS (ALT 636 FOR OP/ED): Performed by: ORTHOPAEDIC SURGERY

## 2024-06-05 PROCEDURE — 2500000004 HC RX 250 GENERAL PHARMACY W/ HCPCS (ALT 636 FOR OP/ED): Mod: JZ | Performed by: ORTHOPAEDIC SURGERY

## 2024-06-05 PROCEDURE — RXMED WILLOW AMBULATORY MEDICATION CHARGE

## 2024-06-05 PROCEDURE — C1769 GUIDE WIRE: HCPCS | Performed by: ORTHOPAEDIC SURGERY

## 2024-06-05 PROCEDURE — 36415 COLL VENOUS BLD VENIPUNCTURE: CPT | Performed by: ORTHOPAEDIC SURGERY

## 2024-06-05 PROCEDURE — 7100000009 HC PHASE TWO TIME - INITIAL BASE CHARGE: Performed by: ORTHOPAEDIC SURGERY

## 2024-06-05 PROCEDURE — 2500000001 HC RX 250 WO HCPCS SELF ADMINISTERED DRUGS (ALT 637 FOR MEDICARE OP): Performed by: ORTHOPAEDIC SURGERY

## 2024-06-05 PROCEDURE — 80048 BASIC METABOLIC PNL TOTAL CA: CPT | Performed by: ORTHOPAEDIC SURGERY

## 2024-06-05 PROCEDURE — 2500000004 HC RX 250 GENERAL PHARMACY W/ HCPCS (ALT 636 FOR OP/ED): Performed by: ANESTHESIOLOGIST ASSISTANT

## 2024-06-05 PROCEDURE — C1713 ANCHOR/SCREW BN/BN,TIS/BN: HCPCS | Performed by: ORTHOPAEDIC SURGERY

## 2024-06-05 PROCEDURE — 2780000003 HC OR 278 NO HCPCS: Performed by: ORTHOPAEDIC SURGERY

## 2024-06-05 PROCEDURE — 3600000004 HC OR TIME - INITIAL BASE CHARGE - PROCEDURE LEVEL FOUR: Performed by: ORTHOPAEDIC SURGERY

## 2024-06-05 PROCEDURE — 3700000002 HC GENERAL ANESTHESIA TIME - EACH INCREMENTAL 1 MINUTE: Performed by: ORTHOPAEDIC SURGERY

## 2024-06-05 PROCEDURE — 76000 FLUOROSCOPY <1 HR PHYS/QHP: CPT | Mod: 59

## 2024-06-05 PROCEDURE — 2500000004 HC RX 250 GENERAL PHARMACY W/ HCPCS (ALT 636 FOR OP/ED): Performed by: STUDENT IN AN ORGANIZED HEALTH CARE EDUCATION/TRAINING PROGRAM

## 2024-06-05 PROCEDURE — 2500000005 HC RX 250 GENERAL PHARMACY W/O HCPCS: Performed by: STUDENT IN AN ORGANIZED HEALTH CARE EDUCATION/TRAINING PROGRAM

## 2024-06-05 PROCEDURE — 7100000001 HC RECOVERY ROOM TIME - INITIAL BASE CHARGE: Performed by: ORTHOPAEDIC SURGERY

## 2024-06-05 PROCEDURE — 2720000007 HC OR 272 NO HCPCS: Performed by: ORTHOPAEDIC SURGERY

## 2024-06-05 PROCEDURE — 2500000005 HC RX 250 GENERAL PHARMACY W/O HCPCS: Performed by: ANESTHESIOLOGIST ASSISTANT

## 2024-06-05 PROCEDURE — 3700000001 HC GENERAL ANESTHESIA TIME - INITIAL BASE CHARGE: Performed by: ORTHOPAEDIC SURGERY

## 2024-06-05 PROCEDURE — 2500000005 HC RX 250 GENERAL PHARMACY W/O HCPCS: Performed by: ORTHOPAEDIC SURGERY

## 2024-06-05 PROCEDURE — 3600000009 HC OR TIME - EACH INCREMENTAL 1 MINUTE - PROCEDURE LEVEL FOUR: Performed by: ORTHOPAEDIC SURGERY

## 2024-06-05 PROCEDURE — 7100000002 HC RECOVERY ROOM TIME - EACH INCREMENTAL 1 MINUTE: Performed by: ORTHOPAEDIC SURGERY

## 2024-06-05 DEVICE — WASHER, F/LARGE SCREWS, 13 MM, STAINLESS STEEL: Type: IMPLANTABLE DEVICE | Site: ARM | Status: FUNCTIONAL

## 2024-06-05 DEVICE — 6.5MM CANNULATED SCREW 32MM THREAD 180MM: Type: IMPLANTABLE DEVICE | Site: ARM | Status: FUNCTIONAL

## 2024-06-05 RX ORDER — SODIUM CHLORIDE, SODIUM LACTATE, POTASSIUM CHLORIDE, CALCIUM CHLORIDE 600; 310; 30; 20 MG/100ML; MG/100ML; MG/100ML; MG/100ML
100 INJECTION, SOLUTION INTRAVENOUS CONTINUOUS
Status: DISCONTINUED | OUTPATIENT
Start: 2024-06-05 | End: 2024-06-05 | Stop reason: HOSPADM

## 2024-06-05 RX ORDER — METOPROLOL TARTRATE 25 MG/1
50 TABLET, FILM COATED ORAL DAILY
Status: CANCELLED | OUTPATIENT
Start: 2024-06-05

## 2024-06-05 RX ORDER — HYDROCODONE BITARTRATE AND ACETAMINOPHEN 5; 325 MG/1; MG/1
1 TABLET ORAL EVERY 6 HOURS PRN
Status: DISCONTINUED | OUTPATIENT
Start: 2024-06-05 | End: 2024-06-05 | Stop reason: HOSPADM

## 2024-06-05 RX ORDER — FENTANYL CITRATE 50 UG/ML
50 INJECTION, SOLUTION INTRAMUSCULAR; INTRAVENOUS ONCE
Status: CANCELLED | OUTPATIENT
Start: 2024-06-05 | End: 2024-06-05

## 2024-06-05 RX ORDER — BUPIVACAINE HYDROCHLORIDE 5 MG/ML
INJECTION, SOLUTION PERINEURAL AS NEEDED
Status: DISCONTINUED | OUTPATIENT
Start: 2024-06-05 | End: 2024-06-05 | Stop reason: HOSPADM

## 2024-06-05 RX ORDER — ONDANSETRON HYDROCHLORIDE 2 MG/ML
4 INJECTION, SOLUTION INTRAVENOUS ONCE AS NEEDED
Status: DISCONTINUED | OUTPATIENT
Start: 2024-06-05 | End: 2024-06-05 | Stop reason: HOSPADM

## 2024-06-05 RX ORDER — PROPOFOL 10 MG/ML
INJECTION, EMULSION INTRAVENOUS AS NEEDED
Status: DISCONTINUED | OUTPATIENT
Start: 2024-06-05 | End: 2024-06-05

## 2024-06-05 RX ORDER — MIDAZOLAM HYDROCHLORIDE 1 MG/ML
2 INJECTION, SOLUTION INTRAMUSCULAR; INTRAVENOUS ONCE
Status: CANCELLED | OUTPATIENT
Start: 2024-06-05 | End: 2024-06-05

## 2024-06-05 RX ORDER — FENTANYL CITRATE 50 UG/ML
50 INJECTION, SOLUTION INTRAMUSCULAR; INTRAVENOUS EVERY 5 MIN PRN
Status: DISCONTINUED | OUTPATIENT
Start: 2024-06-05 | End: 2024-06-05 | Stop reason: HOSPADM

## 2024-06-05 RX ORDER — CLOPIDOGREL BISULFATE 75 MG/1
75 TABLET ORAL DAILY
Status: CANCELLED | OUTPATIENT
Start: 2024-06-05

## 2024-06-05 RX ORDER — NITROGLYCERIN 0.4 MG/1
0.4 TABLET SUBLINGUAL EVERY 5 MIN PRN
Status: CANCELLED | OUTPATIENT
Start: 2024-06-05

## 2024-06-05 RX ORDER — LABETALOL HYDROCHLORIDE 5 MG/ML
5 INJECTION, SOLUTION INTRAVENOUS EVERY 5 MIN PRN
Status: DISCONTINUED | OUTPATIENT
Start: 2024-06-05 | End: 2024-06-05 | Stop reason: HOSPADM

## 2024-06-05 RX ORDER — ACETAMINOPHEN 325 MG/1
650 TABLET ORAL ONCE
Status: CANCELLED | OUTPATIENT
Start: 2024-06-05

## 2024-06-05 RX ORDER — SODIUM CHLORIDE, SODIUM LACTATE, POTASSIUM CHLORIDE, CALCIUM CHLORIDE 600; 310; 30; 20 MG/100ML; MG/100ML; MG/100ML; MG/100ML
40 INJECTION, SOLUTION INTRAVENOUS CONTINUOUS
Status: DISCONTINUED | OUTPATIENT
Start: 2024-06-05 | End: 2024-06-05 | Stop reason: HOSPADM

## 2024-06-05 RX ORDER — LISINOPRIL 20 MG/1
20 TABLET ORAL DAILY
Status: CANCELLED | OUTPATIENT
Start: 2024-06-05

## 2024-06-05 RX ORDER — IPRATROPIUM BROMIDE 0.5 MG/2.5ML
500 SOLUTION RESPIRATORY (INHALATION) EVERY 30 MIN PRN
Status: DISCONTINUED | OUTPATIENT
Start: 2024-06-05 | End: 2024-06-05 | Stop reason: HOSPADM

## 2024-06-05 RX ORDER — MIDAZOLAM HYDROCHLORIDE 1 MG/ML
INJECTION, SOLUTION INTRAMUSCULAR; INTRAVENOUS AS NEEDED
Status: DISCONTINUED | OUTPATIENT
Start: 2024-06-05 | End: 2024-06-05

## 2024-06-05 RX ORDER — METOPROLOL TARTRATE 1 MG/ML
INJECTION, SOLUTION INTRAVENOUS AS NEEDED
Status: DISCONTINUED | OUTPATIENT
Start: 2024-06-05 | End: 2024-06-05

## 2024-06-05 RX ORDER — ONDANSETRON HYDROCHLORIDE 2 MG/ML
INJECTION, SOLUTION INTRAVENOUS AS NEEDED
Status: DISCONTINUED | OUTPATIENT
Start: 2024-06-05 | End: 2024-06-05

## 2024-06-05 RX ORDER — METFORMIN HYDROCHLORIDE 1000 MG/1
1000 TABLET ORAL 2 TIMES DAILY
Status: CANCELLED | OUTPATIENT
Start: 2024-06-05

## 2024-06-05 RX ORDER — DEXTROMETHORPHAN HYDROBROMIDE, GUAIFENESIN 5; 100 MG/5ML; MG/5ML
650 LIQUID ORAL EVERY 8 HOURS PRN
COMMUNITY

## 2024-06-05 RX ORDER — ROCURONIUM BROMIDE 10 MG/ML
INJECTION, SOLUTION INTRAVENOUS AS NEEDED
Status: DISCONTINUED | OUTPATIENT
Start: 2024-06-05 | End: 2024-06-05

## 2024-06-05 RX ORDER — FENTANYL CITRATE 50 UG/ML
INJECTION, SOLUTION INTRAMUSCULAR; INTRAVENOUS AS NEEDED
Status: DISCONTINUED | OUTPATIENT
Start: 2024-06-05 | End: 2024-06-05

## 2024-06-05 RX ORDER — PHENYLEPHRINE HCL IN 0.9% NACL 1 MG/10 ML
SYRINGE (ML) INTRAVENOUS AS NEEDED
Status: DISCONTINUED | OUTPATIENT
Start: 2024-06-05 | End: 2024-06-05

## 2024-06-05 RX ORDER — LIDOCAINE HYDROCHLORIDE 10 MG/ML
INJECTION INFILTRATION; PERINEURAL AS NEEDED
Status: DISCONTINUED | OUTPATIENT
Start: 2024-06-05 | End: 2024-06-05

## 2024-06-05 RX ORDER — DULOXETIN HYDROCHLORIDE 60 MG/1
60 CAPSULE, DELAYED RELEASE ORAL DAILY
Status: CANCELLED | OUTPATIENT
Start: 2024-06-05

## 2024-06-05 RX ORDER — CEFAZOLIN SODIUM 2 G/100ML
2 INJECTION, SOLUTION INTRAVENOUS ONCE
Status: COMPLETED | OUTPATIENT
Start: 2024-06-05 | End: 2024-06-05

## 2024-06-05 RX ORDER — HYDROCODONE BITARTRATE AND ACETAMINOPHEN 5; 325 MG/1; MG/1
1 TABLET ORAL EVERY 6 HOURS PRN
Qty: 20 TABLET | Refills: 0 | Status: SHIPPED | OUTPATIENT
Start: 2024-06-05

## 2024-06-05 RX ORDER — ALBUTEROL SULFATE 0.83 MG/ML
2.5 SOLUTION RESPIRATORY (INHALATION) EVERY 30 MIN PRN
Status: DISCONTINUED | OUTPATIENT
Start: 2024-06-05 | End: 2024-06-05 | Stop reason: HOSPADM

## 2024-06-05 RX ORDER — ROSUVASTATIN CALCIUM 10 MG/1
10 TABLET, COATED ORAL NIGHTLY
Status: CANCELLED | OUTPATIENT
Start: 2024-06-05

## 2024-06-05 RX ADMIN — SODIUM CHLORIDE, SODIUM LACTATE, POTASSIUM CHLORIDE, AND CALCIUM CHLORIDE: 600; 310; 30; 20 INJECTION, SOLUTION INTRAVENOUS at 07:48

## 2024-06-05 RX ADMIN — SODIUM CHLORIDE, POTASSIUM CHLORIDE, SODIUM LACTATE AND CALCIUM CHLORIDE 100 ML/HR: 600; 310; 30; 20 INJECTION, SOLUTION INTRAVENOUS at 06:30

## 2024-06-05 RX ADMIN — MIDAZOLAM 1 MG: 1 INJECTION INTRAMUSCULAR; INTRAVENOUS at 08:04

## 2024-06-05 RX ADMIN — SUGAMMADEX 200 MG: 100 INJECTION, SOLUTION INTRAVENOUS at 09:29

## 2024-06-05 RX ADMIN — PROPOFOL 150 MG: 10 INJECTION, EMULSION INTRAVENOUS at 08:04

## 2024-06-05 RX ADMIN — ROCURONIUM BROMIDE 50 MG: 10 INJECTION, SOLUTION INTRAVENOUS at 08:04

## 2024-06-05 RX ADMIN — DEXAMETHASONE SODIUM PHOSPHATE 4 MG: 4 INJECTION, SOLUTION INTRAMUSCULAR; INTRAVENOUS at 08:22

## 2024-06-05 RX ADMIN — PROPOFOL 20 MG: 10 INJECTION, EMULSION INTRAVENOUS at 08:08

## 2024-06-05 RX ADMIN — FENTANYL CITRATE 50 MCG: 50 INJECTION, SOLUTION INTRAMUSCULAR; INTRAVENOUS at 08:04

## 2024-06-05 RX ADMIN — LIDOCAINE HYDROCHLORIDE 50 MG: 10 INJECTION, SOLUTION INFILTRATION; PERINEURAL at 08:04

## 2024-06-05 RX ADMIN — FENTANYL CITRATE 50 MCG: 50 INJECTION, SOLUTION INTRAMUSCULAR; INTRAVENOUS at 08:14

## 2024-06-05 RX ADMIN — FENTANYL CITRATE 25 MCG: 50 INJECTION, SOLUTION INTRAMUSCULAR; INTRAVENOUS at 08:52

## 2024-06-05 RX ADMIN — Medication 150 MCG: at 08:24

## 2024-06-05 RX ADMIN — Medication 150 MCG: at 08:30

## 2024-06-05 RX ADMIN — ONDANSETRON HYDROCHLORIDE 4 MG: 2 INJECTION INTRAMUSCULAR; INTRAVENOUS at 09:11

## 2024-06-05 RX ADMIN — CEFAZOLIN SODIUM 2 G: 2 INJECTION, SOLUTION INTRAVENOUS at 07:59

## 2024-06-05 RX ADMIN — HYDROCODONE BITARTRATE AND ACETAMINOPHEN 1 TABLET: 5; 325 TABLET ORAL at 11:12

## 2024-06-05 RX ADMIN — METOPROLOL TARTRATE 5 MG: 5 INJECTION, SOLUTION INTRAVENOUS at 08:04

## 2024-06-05 RX ADMIN — FENTANYL CITRATE 25 MCG: 50 INJECTION, SOLUTION INTRAMUSCULAR; INTRAVENOUS at 08:35

## 2024-06-05 RX ADMIN — MIDAZOLAM 1 MG: 1 INJECTION INTRAMUSCULAR; INTRAVENOUS at 07:51

## 2024-06-05 RX ADMIN — FENTANYL CITRATE 25 MCG: 50 INJECTION, SOLUTION INTRAMUSCULAR; INTRAVENOUS at 09:21

## 2024-06-05 RX ADMIN — PROPOFOL 30 MG: 10 INJECTION, EMULSION INTRAVENOUS at 08:12

## 2024-06-05 SDOH — HEALTH STABILITY: MENTAL HEALTH: CURRENT SMOKER: 0

## 2024-06-05 ASSESSMENT — PAIN SCALES - GENERAL
PAINLEVEL_OUTOF10: 10 - WORST POSSIBLE PAIN
PAINLEVEL_OUTOF10: 5 - MODERATE PAIN
PAINLEVEL_OUTOF10: 9
PAINLEVEL_OUTOF10: 2
PAINLEVEL_OUTOF10: 0 - NO PAIN
PAINLEVEL_OUTOF10: 7
PAINLEVEL_OUTOF10: 0 - NO PAIN

## 2024-06-05 ASSESSMENT — PAIN DESCRIPTION - DESCRIPTORS
DESCRIPTORS: BURNING
DESCRIPTORS: ACHING
DESCRIPTORS: ACHING

## 2024-06-05 ASSESSMENT — PAIN - FUNCTIONAL ASSESSMENT
PAIN_FUNCTIONAL_ASSESSMENT: 0-10
PAIN_FUNCTIONAL_ASSESSMENT: CPOT (CRITICAL CARE PAIN OBSERVATION TOOL)
PAIN_FUNCTIONAL_ASSESSMENT: 0-10
PAIN_FUNCTIONAL_ASSESSMENT: CPOT (CRITICAL CARE PAIN OBSERVATION TOOL)
PAIN_FUNCTIONAL_ASSESSMENT: 0-10
PAIN_FUNCTIONAL_ASSESSMENT: CPOT (CRITICAL CARE PAIN OBSERVATION TOOL)

## 2024-06-05 ASSESSMENT — PAIN SCALES - PAIN ASSESSMENT IN ADVANCED DEMENTIA (PAINAD): TOTALSCORE: MEDICATION (SEE MAR)

## 2024-06-05 ASSESSMENT — COLUMBIA-SUICIDE SEVERITY RATING SCALE - C-SSRS
2. HAVE YOU ACTUALLY HAD ANY THOUGHTS OF KILLING YOURSELF?: NO
1. IN THE PAST MONTH, HAVE YOU WISHED YOU WERE DEAD OR WISHED YOU COULD GO TO SLEEP AND NOT WAKE UP?: NO
6. HAVE YOU EVER DONE ANYTHING, STARTED TO DO ANYTHING, OR PREPARED TO DO ANYTHING TO END YOUR LIFE?: NO

## 2024-06-05 NOTE — H&P
History Of Present Illness  Kristi Jackson is a 73 y.o. female presenting with right olecranon fracture.  Patient had suffered a fall a little over 1 week ago injuring her right elbow and is found to have a displaced right olecranon fracture.  It was discussed with the patient because of the nature of this she would benefit from surgery     Past Medical History  Past Medical History:   Diagnosis Date    ASHD (arteriosclerotic heart disease)     Atrial fibrillation (Multi)        Surgical History  Past Surgical History:   Procedure Laterality Date    CORONARY ANGIOPLASTY WITH STENT PLACEMENT      MR HEAD ANGIO WO IV CONTRAST  06/03/2021    MR HEAD ANGIO WO IV CONTRAST [facility]        Social History  She reports that she has never smoked. She has never used smokeless tobacco. She reports current alcohol use. She reports that she does not use drugs.    Family History  Family History   Problem Relation Name Age of Onset    Heart attack Mother          Allergies  Patient has no known allergies.    Review of Systems   Musculoskeletal:         Right elbow pain   All other systems reviewed and are negative.       Physical Exam  Vitals reviewed.   Constitutional:       Appearance: Normal appearance.   HENT:      Head: Normocephalic and atraumatic.      Nose: Nose normal.      Mouth/Throat:      Mouth: Mucous membranes are moist.      Pharynx: Oropharynx is clear.   Eyes:      Extraocular Movements: Extraocular movements intact.   Cardiovascular:      Rate and Rhythm: Normal rate.   Pulmonary:      Effort: Pulmonary effort is normal.   Abdominal:      General: Abdomen is flat.      Palpations: Abdomen is soft.   Musculoskeletal:      Right elbow: Swelling present. Tenderness present in olecranon process.      Comments: Tenderness and some swelling about the right elbow with notable resolving appearing ecchymosis about the elbow.  Sensation intact light touch median radial ulnar distributions   Skin:      General: Skin is warm.      Capillary Refill: Capillary refill takes less than 2 seconds.   Neurological:      General: No focal deficit present.      Mental Status: She is alert.   Psychiatric:         Mood and Affect: Mood normal.          Last Recorded Vitals  Blood pressure 123/62, pulse 65, temperature 36 °C (96.8 °F), temperature source Temporal, resp. rate 16, SpO2 97%.    Relevant Results  No results found for this or any previous visit (from the past 24 hour(s)).     X-rays to demonstrate displaced right olecranon fracture     Assessment/Plan   Active Problems:  There are no active Hospital Problems.      73-year-old female with a a displaced right olecranon fracture.  It is been discussed with the patient she would benefit from open reduction internal fixation of the right olecranon.  Risks and benefits were further discussed including but not limited to bleeding infection damage to blood vessels nerves veins tendons malunion nonunion residual elbow pain stiffness failure of fixation need for repeat surgical procedures.  Patient agreeable and wished to proceed.  Patient has been seen and cleared by her cardiologist.  Will plan on proceeding later today       I spent 30 minutes in the professional and overall care of this patient.      Felipe Pace MD

## 2024-06-05 NOTE — POST-PROCEDURE NOTE
Patient very lethargic, but awakens easily. Alert and oriented x3. Encourage sips of liquids. Used IS . Refusing solids. Head of bed elevated slingon and arm supported. Splint slightly damp.

## 2024-06-05 NOTE — OP NOTE
Open Reduction Internal Fixation Elbow **PAT ONADMIT** (R) Operative Note     Date: 2024  OR Location: [facility] OR    Name: Kristi Jackson : 1950, Age: 73 y.o., MRN: [id number], Sex: female    Diagnosis  Pre-op Diagnosis     * Displaced fracture of olecranon process with intraarticular extension of right ulna, initial encounter for closed fracture [S52.031A] Post-op Diagnosis     * Displaced fracture of olecranon process with intraarticular extension of right ulna, initial encounter for closed fracture [S52.031A]     Procedures  Open Reduction Internal Fixation Elbow **PAT ONADMIT**  73080 - DC OPEN TREATMENT ULNAR FRACTURE PROXIMAL END      Surgeons      * Felipe Pace - Primary    Resident/Fellow/Other Assistant:  Surgeons and Role:  * No surgeons found with a matching role *  Anastacia Willis    Procedure Summary  Anesthesia: * No anesthesia type entered *  ASA: III  Anesthesia Staff: Anesthesiologist: Enrico Munoz DO  C-AA: BRIDGETTE Frederick  Estimated Blood Loss: 1mL  Intra-op Medications:   Administrations occurring from 0800 to 1020 on 24:   Medication Name Total Dose   BUPivacaine HCl (Marcaine) 0.5 % (5 mg/mL) injection 30 mL              Anesthesia Record               Intraprocedure I/O Totals       None           Specimen: No specimens collected     Staff:   Margeulator: Fabi  Circulator: Colleen Perez Person: Laya Perez Person: Juan C         Drains and/or Catheters: * None in log *    Tourniquet Times:   * Missing tourniquet times found for documented tourniquets in lo *     Implants: Synthes 6.5mm cannulated (115mm) with washer  Implants       Type Name Action Serial No.       6.5 MM X 180 MM L, PARTIALLY THREADED (32 MM), STAINLESS STEEL CANNULATED SCREW Implanted      Screw WASHER, F/LARGE SCREWS, 13 MM, STAINLESS STEEL - [id number] Implanted               Findings: Transverse right olecranon fracture    Indications: Kristi Jackson is an 73 y.o.  female who is having surgery for right elbow olecranon fracture.  73-year-old female with a displaced right olecranon fracture.  It is been discussed with the patient she would benefit from open reduction internal fixation of the right olecranon.  Risks and benefits of this were further discussed including but not limited to bleeding infection damage to blood vessels nerves veins tendons malunion nonunion residual elbow pain stiffness failure of fixation need for repeat surgical procedures.  Patient agreeable and wished to proceed    The patient was seen in the preoperative area. The risks, benefits, complications, treatment options, non-operative alternatives, expected recovery and outcomes were discussed with the patient. The possibilities of reaction to medication, pulmonary aspiration, injury to surrounding structures, bleeding, recurrent infection, the need for additional procedures, failure to diagnose a condition, and creating a complication requiring transfusion or operation were discussed with the patient. The patient concurred with the proposed plan, giving informed consent.  The site of surgery was properly noted/marked if necessary per policy. The patient has been actively warmed in preoperative area. Preoperative antibiotics have been ordered and given within 1 hours of incision. Venous thrombosis prophylaxis are not indicated.    Procedure Details: After identification of the patient and marking the correct operative site patient was taken to the operating room.  Perioperative antibiotics administered SCDs applied in the Bieler extremities general anesthesia was administered patient positioned in the lateral cubitus position with a beanbag positioner axillary roll was placed bony prominences were padded tourniquet applied on the patient's right axilla and the right hand and arm were then prepped and draped in the usual sterile fashion.  After prepping and draping an approximately 15 cm incision  overlying the olecranon curving radially around the tip of the olecranon was then marked on the skin.  Arm was then infiltrated with 30 cc of half percent Marcaine.  Esmarch applied and tourniquet inflated to 250 mmHg.  Incision made through the skin with a 15 blade scalpel semis tissues were dissected with Bovie electrocautery down to the triceps fascia.  Medial and lateral soft tissue flaps were elevated.  Fracture site was readily exposed and hematoma evacuated from the fracture site with a combination of rongeurs and curettes.  Approximately 3 cm distal to the fracture site a 2 mm drill hole was made through the proximal ulna to pass a fiber tape suture for our tension band and fiber tape was placed.  We then made a small drill hole dorsally along the olecranon ridge to place the reduction clamp and the fracture was then reduced and held in position with a reduction clamp.  We verified her position with C-arm fluoroscopy and were satisfied.  We then placed our guidepin for our 6 5 cannulated screw and verified this position on C-arm fluoroscopy and multiple views.  Once satisfied with this we then measured and drilled for our screw and then placed our 6 5 screw with a washer.  Prior to final tightening fiber tape suture was threaded under the tricep tendon just proximal to the screw.  Screw was tightened and the fiber tape suture was then tied.  Final x-ray images were then taken.  Wounds copiously irrigated saline solution deep fascial layer closed with 0 Vicryl suture.  Subcutaneous layer 2-0 Vicryl and skin with 4-0 Monocryl and Dermabond.  Tourniquet deflated.  Sterile dressing of Telfa 4 x 4 gauze ABD web roll and a long-arm postmold splint was applied.  Patient awakened from anesthesia and taken from the operating to recover without complication.  I was present for the entire this procedure.  Complications:  None; patient tolerated the procedure well.    Disposition: PACU - hemodynamically  stable.  Condition: stable         Additional Details: none    Attending Attestation:     Felipe Pace  Phone Number: [phone]

## 2024-06-05 NOTE — POST-PROCEDURE NOTE
Pulse ox 85-90% on room air trials. Patient complaining of pain-discussed with anesthesia. Will offer oral pain medication due to risk of worsened pulse ox with IV opioids. Incentive spirometry provided, patient demonstrated understanding. Patient drowsy, responds with verbal arousal. Ginger ale and snack provided

## 2024-06-05 NOTE — NURSING NOTE
Pt received from pacu, drowsy but arousable. Right arm in sling, pt able to move fingers, distal digits swollen from fall, but warm and  pink. Cap refill < 3 seconds.   Pt states is in pain, but is falling asleep. Placed on continuous pulse ox, sp02 93%on room air while she snoozes

## 2024-06-05 NOTE — DISCHARGE INSTRUCTIONS
Discharge Instructions for Peripheral Nerve Block for Upper Extremity  Notify the anesthesiologist on call at [phone]:  If you have any questions or problems regarding your nerve block, go to the nearest emergency room or call 911. If you have coughing, chest pain, and/or shortness of breath unrelieved by sitting up. This may be a serious emergency.     Activity:  Your shoulder, arm, and hand will be numb and weak after surgery.   You will not be able to move your arm until the medicine wears off.  Protect the position of your arm, especially the elbow. Keep your arm in your sling resting on the two pillows while you are awake or sleeping.  Avoid putting your arm or objects that are extremely hot or cold. Your ability to feel hot and cold will be decreased until the numbing medicine wears off.    Pain Medicine:  The numbing effect of the nerve block can last:  Marcaine 16-24 hours  Exparel 28-72 hours  Take your pain medicine the night of surgery before going to sleep or before you feel the numbing medicine starting to wear off.   Take your pain medicine as specified during the day and night even if you do not feel pain.     Additional Instructions:  Have a responsible adult remain with you to assist you at home after surgery. Remember that you will not be able to use your surgical arm to perform activities such as dressing, washing, and eating.   You may experience numbness on the side of your face, hoarseness or congestion or have a red eye and drooping eyelid on the side of surgery. These side effects will decrease as the anesthesia in the shoulder wears off.   You may feel discomfort when breathing after surgery and during recovery. This is caused by the numbness of the nerve the supplies the diaphragm (breathing muscle) on the side of the surgery. You will feel better if you rest and sleep with your head and upper body at a 45 degree angle by using 2-3 pillow or be sitting in a recliner chair. This  discomfort decreases as the anesthesia in the shoulder wears off.

## 2024-06-05 NOTE — ANESTHESIA POSTPROCEDURE EVALUATION
Patient: Kristi Jackson    Procedure Summary       Date: 06/05/24 Room / Location: [facility] OR 05 / [facility]    Anesthesia Start: 0748 Anesthesia Stop: 0949    Procedure: Open Reduction Internal Fixation Elbow **PAT ONADMIT** (Right) Diagnosis:       Displaced fracture of olecranon process with intraarticular extension of right ulna, initial encounter for closed fracture      (right elbow olecranon fracture)    Surgeons: Felipe Pace MD Responsible Provider: Enrico Munoz DO    Anesthesia Type: general, regional ASA Status: 3            Anesthesia Type: general, regional    Vitals Value Taken Time   /74 06/05/24 0955   Temp 36.1 °C (97 °F) 06/05/24 0946   Pulse 96 06/05/24 0955   Resp 18 06/05/24 0955   SpO2 94 % 06/05/24 0955       Anesthesia Post Evaluation    Patient location during evaluation: bedside  Patient participation: complete - patient participated  Level of consciousness: awake and alert  Pain management: adequate  Multimodal analgesia pain management approach  Airway patency: patent  Two or more strategies used to mitigate risk of obstructive sleep apnea  Cardiovascular status: acceptable  Respiratory status: acceptable  Hydration status: acceptable  Postoperative Nausea and Vomiting: none        No notable events documented.

## 2024-06-05 NOTE — ANESTHESIA PROCEDURE NOTES
Airway  Date/Time: 6/5/2024 8:16 AM  Urgency: elective    Airway not difficult    Staffing  Performed: BRIDGETTE and attending   Authorized by: Enrico Munoz DO    Performed by: BRIDGETTE Frederick  Patient location during procedure: OR    Indications and Patient Condition  Indications for airway management: anesthesia and airway protection  Spontaneous Ventilation: absent  Sedation level: deep  Preoxygenated: yes  Patient position: sniffing  MILS maintained throughout  Mask difficulty assessment: 1 - vent by mask    Final Airway Details  Final airway type: endotracheal airway      Successful airway: ETT  Cuffed: yes   Successful intubation technique: video laryngoscopy  Facilitating devices/methods: intubating stylet  Endotracheal tube insertion site: oral  Blade: Yvonne  Blade size: #3  ETT size (mm): 7.5  Cormack-Lehane Classification: grade I - full view of glottis  Placement verified by: chest auscultation and capnometry   Cuff volume (mL): 6  Measured from: gums  ETT to gums (cm): 22  Number of attempts at approach: 1  Ventilation between attempts: BVM  Number of other approaches attempted: 0    Additional Comments  Ventilator not delivering medical gas.  Not able to administer positive pressure ventilation.  No immediate source of leak identified.  Immediately called for ambu bag and help to identify source of ventilator malfunction.  Patient stable, easy mask ventilation with ambubag.  Source of leak identified after 5-10 mins and confirmed to not have significant leak prior to intubating patient and placing on ventilator.

## 2024-06-05 NOTE — POST-PROCEDURE NOTE
More awake.walked to brp with 1 assist. Sling on. Voided qs. Assisted dressing. Gave  hg instructuion on phone. Will be picking patient up.

## 2024-06-05 NOTE — SIGNIFICANT EVENT
Patient fell two weeks ago at [employer] trying to reach something on the top shelf. Lost her balance and fell on her right elbow.

## 2024-06-05 NOTE — ANESTHESIA PREPROCEDURE EVALUATION
Chief Complaint   Patient presents with   • Ear Pain     seen yesterday states has ruptured ear drum.   • Medication Refill     lost prescriptions she receved yesterday at this facility for same complaint     Triage process explained to patient.  Pt back to waiting room.  Pt instructed to inform RN if any changes or questions arise.     Patient: Kristi Jackson    Procedure Information       Date/Time: 06/05/24 0800    Procedure: Open Reduction Internal Fixation Elbow **PAT ONADMIT** (Right)    Location: [facility] 05 / [facility]    Surgeons: Felipe Pace MD          Past Medical History:   Diagnosis Date   • ASHD (arteriosclerotic heart disease)    • Atrial fibrillation (Multi)      Past Surgical History:   Procedure Laterality Date   • CORONARY ANGIOPLASTY WITH STENT PLACEMENT     • MR HEAD ANGIO WO IV CONTRAST  06/03/2021    MR HEAD ANGIO WO IV CONTRAST [facility]       Relevant Problems   Anesthesia (within normal limits)      Cardiac  NSTEMI 6/2023.  LUCY to LAD. Stable since then. No angina, no signs HF  EF 65-70%   (+) ASHD (arteriosclerotic heart disease)   (+) Angina pectoris (CMS-[facility])   (+) Anginal equivalent (CMS-[facility])   (+) Atrial fibrillation with RVR (Multi)   (+) CAD (coronary artery disease)   (+) Chest pain   (+) Chronic atrial fibrillation (Multi)   (+) Essential (primary) hypertension      Pulmonary   (+) Shortness of breath on exertion      Neuro   (+) Anxiety   (+) Recurrent major depressive disorder (CMS-[facility])      GI   (+) Gastroesophageal reflux disease   (+) IBS (irritable bowel syndrome)      /Renal   (+) Acute cystitis with hematuria   (+) UTI (urinary tract infection)      Endocrine   (+) Diabetic neuropathy (Multi)   (+) Obesity   (+) Type 2 diabetes mellitus with hyperglycemia (Multi)   (+) Type 2 diabetes mellitus without complications (Multi)      HEENT   (+) Bilateral hearing loss   (+) Hearing loss      ID   (+) UTI (urinary tract infection)   (+) Yeast vaginitis       Clinical information reviewed:   Tobacco  Allergies  Meds   Med Hx  Surg Hx  OB Status  Fam Hx  Soc   Hx        NPO Detail:  NPO/Void Status  Carbohydrate Drink Given Prior to Surgery? : N  Date of Last Liquid: 06/04/24  Time of Last Liquid: 2130  Date of Last Solid: 06/04/24  Time of Last Solid: 1800  Last Intake Type:  Clear fluids  Time of Last Void: 0430         Physical Exam    Airway  Mallampati: II  TM distance: >3 FB  Neck ROM: full     Cardiovascular    Dental    Pulmonary    Abdominal          Anesthesia Plan    History of general anesthesia?: yes  History of complications of general anesthesia?: no    ASA 3     general and regional     The patient is not a current smoker.  Patient was not previously instructed to abstain from smoking on day of procedure.  Patient did not smoke on day of procedure.  Education provided regarding risk of obstructive sleep apnea.  intravenous induction   Postoperative administration of opioids is intended.  Anesthetic plan and risks discussed with patient.  Use of blood products discussed with patient who consented to blood products.    Plan discussed with CRNA and CAA.

## 2024-06-06 ASSESSMENT — PAIN SCALES - GENERAL: PAINLEVEL_OUTOF10: 9

## 2024-06-28 ENCOUNTER — TELEPHONE (OUTPATIENT)
Dept: PRIMARY CARE | Facility: CLINIC | Age: 74
End: 2024-06-28
Payer: COMMERCIAL

## 2024-06-28 DIAGNOSIS — E11.9 TYPE 2 DIABETES MELLITUS WITHOUT COMPLICATION, UNSPECIFIED WHETHER LONG TERM INSULIN USE (MULTI): Primary | ICD-10-CM

## 2024-07-08 ENCOUNTER — PHARMACY VISIT (OUTPATIENT)
Dept: PHARMACY | Facility: CLINIC | Age: 74
End: 2024-07-08
Payer: COMMERCIAL

## 2024-07-08 PROCEDURE — RXMED WILLOW AMBULATORY MEDICATION CHARGE

## 2024-07-19 ENCOUNTER — APPOINTMENT (OUTPATIENT)
Dept: PRIMARY CARE | Facility: CLINIC | Age: 74
End: 2024-07-19
Payer: COMMERCIAL

## 2024-07-19 ENCOUNTER — TELEPHONE (OUTPATIENT)
Dept: PRIMARY CARE | Facility: CLINIC | Age: 74
End: 2024-07-19

## 2024-07-19 VITALS
BODY MASS INDEX: 33.3 KG/M2 | WEIGHT: 194 LBS | HEART RATE: 83 BPM | TEMPERATURE: 97.7 F | DIASTOLIC BLOOD PRESSURE: 72 MMHG | SYSTOLIC BLOOD PRESSURE: 118 MMHG | OXYGEN SATURATION: 97 %

## 2024-07-19 DIAGNOSIS — R29.6 FREQUENT FALLS: Primary | ICD-10-CM

## 2024-07-19 DIAGNOSIS — E11.65 TYPE 2 DIABETES MELLITUS WITH HYPERGLYCEMIA, WITHOUT LONG-TERM CURRENT USE OF INSULIN (MULTI): ICD-10-CM

## 2024-07-19 DIAGNOSIS — E11.65 TYPE 2 DIABETES MELLITUS WITH HYPERGLYCEMIA, WITHOUT LONG-TERM CURRENT USE OF INSULIN (MULTI): Primary | ICD-10-CM

## 2024-07-19 LAB — POC HEMOGLOBIN A1C: 10.5 % (ref 4.2–6.5)

## 2024-07-19 PROCEDURE — 1125F AMNT PAIN NOTED PAIN PRSNT: CPT | Performed by: STUDENT IN AN ORGANIZED HEALTH CARE EDUCATION/TRAINING PROGRAM

## 2024-07-19 PROCEDURE — 3078F DIAST BP <80 MM HG: CPT | Performed by: STUDENT IN AN ORGANIZED HEALTH CARE EDUCATION/TRAINING PROGRAM

## 2024-07-19 PROCEDURE — 1159F MED LIST DOCD IN RCRD: CPT | Performed by: STUDENT IN AN ORGANIZED HEALTH CARE EDUCATION/TRAINING PROGRAM

## 2024-07-19 PROCEDURE — 83036 HEMOGLOBIN GLYCOSYLATED A1C: CPT | Performed by: STUDENT IN AN ORGANIZED HEALTH CARE EDUCATION/TRAINING PROGRAM

## 2024-07-19 PROCEDURE — 1158F ADVNC CARE PLAN TLK DOCD: CPT | Performed by: STUDENT IN AN ORGANIZED HEALTH CARE EDUCATION/TRAINING PROGRAM

## 2024-07-19 PROCEDURE — 1123F ACP DISCUSS/DSCN MKR DOCD: CPT | Performed by: STUDENT IN AN ORGANIZED HEALTH CARE EDUCATION/TRAINING PROGRAM

## 2024-07-19 PROCEDURE — 4010F ACE/ARB THERAPY RXD/TAKEN: CPT | Performed by: STUDENT IN AN ORGANIZED HEALTH CARE EDUCATION/TRAINING PROGRAM

## 2024-07-19 PROCEDURE — 3074F SYST BP LT 130 MM HG: CPT | Performed by: STUDENT IN AN ORGANIZED HEALTH CARE EDUCATION/TRAINING PROGRAM

## 2024-07-19 ASSESSMENT — PAIN SCALES - GENERAL: PAINLEVEL: 3

## 2024-07-19 NOTE — PROGRESS NOTES
Subjective   Kristi Jackson is a 73 y.o. female who presents for Diabetes Follow up. Did not have pre-appt labs done.    INTERVAL HX:  6/05/2024 Rt elbow fracture requiring ORIF - Dr. Pace. Had a fall in Feb and broke humerus as well. No surgery needed, still having some soreness in the shoulder.  Last DM check 4/2024 with EVERETTE - was recommended to start ozempic. Was not taking Trulicity regularly. Does not seem rx was ever sent.   Having more falls at home. Three since the first of the year. Is not sure what is happening or why she is falling. Loses balance, sometimes feels like something pushes her back. Was referred to neurology. Has been having a hard time getting scheduled. Did have CT done after falling and breaking her elbow. Had one done also when she fell prior to that.   Is currently in PT. Seeing Dr. Mcgrath next week. Is currently in therapy but she is pretty sure she is out of benefits with her insurance.  Seeing Dr. Rodriguez for Afib    HPI   T2DM:  Current medication regimen:   --- discontinued after was forgetting to take. Plan was to start on ozempic 4/2024. Had appt with pharmacist to get started but did not make it to the appt, never called to reschedule.  -Metformin XR 1000mg BID  Tolerating current medication regimen yes  Blood sugars at home averaging 200's. Checks every morning, but sometimes in the afternoons  Diet has not been good.   CGM? no - not covered by insurance  Hypoglycemic episodes no  Last A1C : 10.5 POC today, 9.1 (POC 4/3/2024), 7.5 (POC 1/03/24), 7.1, 5.9   Last eGFR -   Last Creatinine -  Last microalbumin -  Up to date on yearly eye exams yes  -Last eye exam - 12/2023 Dr. Cavanaugh  Follows with podiatry no  - Statin? Yes - known ASCVD  - ACE-I/ARB? Yes  - Aspirin? No  - Last diabetic foot exam:  - Pneumococcal vaccine? Done  RSV, covid, flu done    LAB REVIEW   POC HEMOGLOBIN A1c (%)   Date Value   04/03/2024 9.1 (A)   01/03/2024 7.5 (A)     Hemoglobin A1C (%)   Date  Value   09/06/2023 7.1 (H)   06/29/2023 5.9   06/02/2023 5.9     Glucose   Date Value   06/05/2024 297 mg/dL (H)   09/07/2023 335 MG/DL (H)   09/06/2023 216 MG/DL (H)   06/30/2023 129 MG/DL (H)     Creatinine   Date Value   06/05/2024 0.60 mg/dL   09/07/2023 0.6 MG/DL   09/06/2023 0.7 MG/DL   06/30/2023 0.7 MG/DL     eGFR (mL/min/1.73m*2)   Date Value   06/05/2024 >90     ESTIMATED GFR (mL/min/1.73 m2)   Date Value   09/07/2023 95   09/06/2023 92   06/30/2023 92     Lab Results   Component Value Date    ALBUR 12 06/29/2023    CREATININE 0.60 06/05/2024     Lab Results   Component Value Date    CHOL 125 (L) 06/03/2023    CHOL 161 06/02/2023    CHOL 218 (H) 06/03/2021     Lab Results   Component Value Date    HDL 40 (L) 06/03/2023    HDL 45 (L) 06/02/2023    HDL 60 06/03/2021     Lab Results   Component Value Date    LDLCALC 55 (L) 06/03/2023    LDLCALC 82 06/02/2023    LDLCALC 129 06/03/2021     Lab Results   Component Value Date    TRIG 149 06/03/2023    TRIG 171 (H) 06/02/2023    TRIG 144 06/03/2021       ROS otherwise negative aside from what was mentioned above in HPI.      Objective   LMP  (LMP Unknown)     Physical Exam    Assessment/Plan

## 2024-07-19 NOTE — Clinical Note
Sybil - looking to get this patient on ozempic. She was set up with you back in April but missed appt. Her DM was previously very well controlled but stopped taking Trulicity several months ago. At last visit with EVERETTE was to get started on ozempic and CGM but missed her appt with you. I have asked her to schedule with you for diabetic management/CGM(affordable)/ozempic (or other GLP/GIP)start

## 2024-07-22 NOTE — TELEPHONE ENCOUNTER
Patient was contacted today regarding scheduling with Sybil, however she said she cannot schedule at this time, and she would call back.

## 2024-07-22 NOTE — TELEPHONE ENCOUNTER
Also pt was to be scheduled with Sybil for DM. Does not look like it was scheduled. Can someone contact her and get her scheduled. Thanks

## 2024-07-29 ENCOUNTER — TELEPHONE (OUTPATIENT)
Dept: PRIMARY CARE | Facility: CLINIC | Age: 74
End: 2024-07-29
Payer: COMMERCIAL

## 2024-07-29 NOTE — TELEPHONE ENCOUNTER
Refill for     Diazepam 5 mg    Pharmacy is [facility] on [address]    Patient can be reached at [phone]

## 2024-07-31 NOTE — TELEPHONE ENCOUNTER
Unfortunately I am not able to prescribe her the medication without and office visit for a controlled medication review.

## 2024-08-12 ENCOUNTER — OFFICE VISIT (OUTPATIENT)
Dept: CARDIOLOGY | Facility: CLINIC | Age: 74
End: 2024-08-12
Payer: COMMERCIAL

## 2024-08-12 VITALS
HEART RATE: 94 BPM | DIASTOLIC BLOOD PRESSURE: 60 MMHG | SYSTOLIC BLOOD PRESSURE: 98 MMHG | BODY MASS INDEX: 32.61 KG/M2 | WEIGHT: 190 LBS | OXYGEN SATURATION: 98 %

## 2024-08-12 DIAGNOSIS — R26.9 GAIT DISORDER: ICD-10-CM

## 2024-08-12 DIAGNOSIS — I10 ESSENTIAL (PRIMARY) HYPERTENSION: ICD-10-CM

## 2024-08-12 DIAGNOSIS — I25.10 ASHD (ARTERIOSCLEROTIC HEART DISEASE): Primary | ICD-10-CM

## 2024-08-12 DIAGNOSIS — I48.91 ATRIAL FIBRILLATION WITH RVR (MULTI): ICD-10-CM

## 2024-08-12 PROCEDURE — 1126F AMNT PAIN NOTED NONE PRSNT: CPT | Performed by: INTERNAL MEDICINE

## 2024-08-12 PROCEDURE — 3078F DIAST BP <80 MM HG: CPT | Performed by: INTERNAL MEDICINE

## 2024-08-12 PROCEDURE — 99214 OFFICE O/P EST MOD 30 MIN: CPT | Performed by: INTERNAL MEDICINE

## 2024-08-12 PROCEDURE — 1036F TOBACCO NON-USER: CPT | Performed by: INTERNAL MEDICINE

## 2024-08-12 PROCEDURE — 1123F ACP DISCUSS/DSCN MKR DOCD: CPT | Performed by: INTERNAL MEDICINE

## 2024-08-12 PROCEDURE — 1159F MED LIST DOCD IN RCRD: CPT | Performed by: INTERNAL MEDICINE

## 2024-08-12 PROCEDURE — 4010F ACE/ARB THERAPY RXD/TAKEN: CPT | Performed by: INTERNAL MEDICINE

## 2024-08-12 PROCEDURE — 3074F SYST BP LT 130 MM HG: CPT | Performed by: INTERNAL MEDICINE

## 2024-08-12 ASSESSMENT — ENCOUNTER SYMPTOMS
PND: 0
WEIGHT LOSS: 0
MYALGIAS: 0
FEVER: 0
COUGH: 0
PALPITATIONS: 0
WEIGHT GAIN: 0
DIZZINESS: 0
DYSPNEA ON EXERTION: 0
NEAR-SYNCOPE: 0
SYNCOPE: 0
OCCASIONAL FEELINGS OF UNSTEADINESS: 1
ORTHOPNEA: 0
CLAUDICATION: 0
SHORTNESS OF BREATH: 0
LOSS OF SENSATION IN FEET: 1
DIAPHORESIS: 0
IRREGULAR HEARTBEAT: 0
WHEEZING: 0
DEPRESSION: 0
WEAKNESS: 0

## 2024-08-12 ASSESSMENT — PATIENT HEALTH QUESTIONNAIRE - PHQ9
2. FEELING DOWN, DEPRESSED OR HOPELESS: NOT AT ALL
1. LITTLE INTEREST OR PLEASURE IN DOING THINGS: NOT AT ALL
SUM OF ALL RESPONSES TO PHQ9 QUESTIONS 1 AND 2: 0

## 2024-08-12 ASSESSMENT — PAIN SCALES - GENERAL: PAINLEVEL: 0-NO PAIN

## 2024-08-12 NOTE — PROGRESS NOTES
Subjective      No chief complaint on file.       73-year-old female with known atherosclerotic heart disease, moderate nonobstructive disease particularly in her LAD seen on catheterization in 2017. She presented to the hospital June 2023 and was found with an NSTEMI and new onset atrial fibrillation. We adopted a rate control strategy. Regarding the NSTEMI she was treated per ACS protocol. She went to the Cath Lab as part of an early invasive approach and we discovered an occluded mid circumflex. This was amenable to percutaneous intervention we deployed a 2.25 x 28 Synergy everolimus eluding stent. Attempts to post dilate the stent were unsuccessful. The angiographic result was excellent. She had an echocardiogram that showed normal left ventricular size and function and trivial valvular disease. Regarding her anticoagulant/antiplatelet regimen we discharged her on Eliquis Plavix and aspirin, stopped the aspirin 3 weeks later. She was seen in the office, still in AF. Plan was for 1m f/u to decide on DCCV.     She has fallen 5 times since January, unclear mechanism. There are no reports of loss of consiousness. She has no chest pain or dyspnea         Review of Systems   Constitutional: Negative for diaphoresis, fever, weight gain and weight loss.   Eyes:  Negative for visual disturbance.   Cardiovascular:  Negative for chest pain, claudication, dyspnea on exertion, irregular heartbeat, leg swelling, near-syncope, orthopnea, palpitations, paroxysmal nocturnal dyspnea and syncope.   Respiratory:  Negative for cough, shortness of breath and wheezing.    Musculoskeletal:  Negative for muscle weakness and myalgias.   Neurological:  Negative for dizziness and weakness.   All other systems reviewed and are negative.       Past Medical History:   Diagnosis Date    ASHD (arteriosclerotic heart disease)     Atrial fibrillation (Multi)         Past Surgical History:   Procedure Laterality Date    CORONARY ANGIOPLASTY WITH  STENT PLACEMENT      MR HEAD ANGIO WO IV CONTRAST  06/03/2021    MR HEAD ANGIO WO IV CONTRAST [facility]        Social History     Socioeconomic History    Marital status:      Spouse name: Not on file    Number of children: Not on file    Years of education: Not on file    Highest education level: Not on file   Occupational History    Not on file   Tobacco Use    Smoking status: Never    Smokeless tobacco: Never   Vaping Use    Vaping status: Never Used   Substance and Sexual Activity    Alcohol use: Not Currently    Drug use: Never    Sexual activity: Not on file   Other Topics Concern    Not on file   Social History Narrative    Not on file     Social Determinants of Health     Financial Resource Strain: Not on file   Food Insecurity: Not on file   Transportation Needs: Not on file   Physical Activity: Not on file   Stress: Not on file   Social Connections: Not on file   Intimate Partner Violence: Not on file   Housing Stability: Not on file        Family History   Problem Relation Name Age of Onset    Heart attack Mother          OBJECTIVE:    Vitals:    08/12/24 1450   BP: 98/60   Pulse: 94   SpO2: 98%        Vitals reviewed.   Constitutional:       Appearance: Normal and healthy appearance. Not in distress.   Pulmonary:      Effort: Pulmonary effort is normal.      Breath sounds: Normal breath sounds.   Cardiovascular:      Normal rate. Irregular rhythm. Normal S1. Normal S2.       Murmurs: There is no murmur.      No gallop.  No click.   Pulses:     Intact distal pulses.   Edema:     Peripheral edema absent.   Skin:     General: Skin is warm and dry.   Neurological:      General: No focal deficit present.          Lab Review:   Lab Results   Component Value Date     06/05/2024    K 4.2 06/05/2024     06/05/2024    CO2 21 (L) 06/05/2024    BUN 13 06/05/2024    CREATININE 0.60 06/05/2024    GLUCOSE 297 (H) 06/05/2024    CALCIUM 8.8 06/05/2024     Lab Results   Component Value Date     CHOL 125 (L) 06/03/2023    TRIG 149 06/03/2023    HDL 40 (L) 06/03/2023       Lab Results   Component Value Date    LDLCALC 55 (L) 06/03/2023        Essential (primary) hypertension  Low in office today. Usually 130/70s at home. Observe without med changes    ASHD (arteriosclerotic heart disease)  Stable without angina, continue eliquis can stop clopidogrel. Continue statin, checking lipids    Atrial fibrillation with RVR (Multi)  Continue rate control, continue AC for stroke risk reduction

## 2024-08-13 ENCOUNTER — LAB (OUTPATIENT)
Dept: LAB | Facility: LAB | Age: 74
End: 2024-08-13
Payer: COMMERCIAL

## 2024-08-13 DIAGNOSIS — I25.10 ASHD (ARTERIOSCLEROTIC HEART DISEASE): ICD-10-CM

## 2024-08-13 DIAGNOSIS — E11.9 TYPE 2 DIABETES MELLITUS WITHOUT COMPLICATION, UNSPECIFIED WHETHER LONG TERM INSULIN USE (MULTI): ICD-10-CM

## 2024-08-13 LAB
ALBUMIN SERPL-MCNC: 4.1 G/DL (ref 3.5–5)
ALP BLD-CCNC: 94 U/L (ref 35–125)
ALT SERPL-CCNC: 22 U/L (ref 5–40)
ANION GAP SERPL CALC-SCNC: 14 MMOL/L
AST SERPL-CCNC: 16 U/L (ref 5–40)
BILIRUB SERPL-MCNC: 0.6 MG/DL (ref 0.1–1.2)
BUN SERPL-MCNC: 17 MG/DL (ref 8–25)
CALCIUM SERPL-MCNC: 9.3 MG/DL (ref 8.5–10.4)
CHLORIDE SERPL-SCNC: 98 MMOL/L (ref 97–107)
CHOLEST SERPL-MCNC: 112 MG/DL (ref 133–200)
CHOLEST/HDLC SERPL: 2.3 {RATIO}
CO2 SERPL-SCNC: 25 MMOL/L (ref 24–31)
CREAT SERPL-MCNC: 0.7 MG/DL (ref 0.4–1.6)
EGFRCR SERPLBLD CKD-EPI 2021: >90 ML/MIN/1.73M*2
EST. AVERAGE GLUCOSE BLD GHB EST-MCNC: 283 MG/DL
GLUCOSE SERPL-MCNC: 270 MG/DL (ref 65–99)
HBA1C MFR BLD: 11.5 %
HDLC SERPL-MCNC: 49 MG/DL
LDLC SERPL CALC-MCNC: 21 MG/DL (ref 65–130)
POTASSIUM SERPL-SCNC: 4.1 MMOL/L (ref 3.4–5.1)
PROT SERPL-MCNC: 6.3 G/DL (ref 5.9–7.9)
SODIUM SERPL-SCNC: 137 MMOL/L (ref 133–145)
TRIGL SERPL-MCNC: 211 MG/DL (ref 40–150)

## 2024-08-13 PROCEDURE — 80061 LIPID PANEL: CPT

## 2024-08-13 PROCEDURE — 83036 HEMOGLOBIN GLYCOSYLATED A1C: CPT

## 2024-08-13 PROCEDURE — 80053 COMPREHEN METABOLIC PANEL: CPT

## 2024-08-13 PROCEDURE — 36415 COLL VENOUS BLD VENIPUNCTURE: CPT

## 2024-08-21 ENCOUNTER — TELEPHONE (OUTPATIENT)
Dept: PRIMARY CARE | Facility: CLINIC | Age: 74
End: 2024-08-21
Payer: COMMERCIAL

## 2024-08-21 PROCEDURE — RXMED WILLOW AMBULATORY MEDICATION CHARGE

## 2024-08-21 NOTE — TELEPHONE ENCOUNTER
Please contact pt and let her know I checked with Sybil and we should be able to get her on Mounjaro or ozempic for better diabetes control. If she is willing, I will send the prescription to the pharmacy.

## 2024-08-22 ENCOUNTER — PHARMACY VISIT (OUTPATIENT)
Dept: PHARMACY | Facility: CLINIC | Age: 74
End: 2024-08-22
Payer: COMMERCIAL

## 2024-08-22 DIAGNOSIS — I48.91 ATRIAL FIBRILLATION WITH RVR (MULTI): ICD-10-CM

## 2024-09-02 DIAGNOSIS — I25.10 ASHD (ARTERIOSCLEROTIC HEART DISEASE): Primary | ICD-10-CM

## 2024-09-04 RX ORDER — METOPROLOL TARTRATE 25 MG/1
50 TABLET, FILM COATED ORAL
Qty: 360 TABLET | Refills: 3 | Status: SHIPPED | OUTPATIENT
Start: 2024-09-04

## 2024-09-04 RX ORDER — CLOPIDOGREL BISULFATE 75 MG/1
75 TABLET ORAL DAILY
Qty: 90 TABLET | Refills: 3 | Status: SHIPPED | OUTPATIENT
Start: 2024-09-04

## 2024-09-04 RX ORDER — ROSUVASTATIN CALCIUM 20 MG/1
20 TABLET, COATED ORAL DAILY
Qty: 90 TABLET | Refills: 3 | Status: SHIPPED | OUTPATIENT
Start: 2024-09-04

## 2024-09-06 DIAGNOSIS — I10 ESSENTIAL (PRIMARY) HYPERTENSION: ICD-10-CM

## 2024-09-06 RX ORDER — LISINOPRIL 20 MG/1
20 TABLET ORAL DAILY
Qty: 90 TABLET | Refills: 1 | Status: SHIPPED | OUTPATIENT
Start: 2024-09-06

## 2024-09-10 ENCOUNTER — PHARMACY VISIT (OUTPATIENT)
Dept: PHARMACY | Facility: CLINIC | Age: 74
End: 2024-09-10
Payer: COMMERCIAL

## 2024-09-10 PROCEDURE — RXMED WILLOW AMBULATORY MEDICATION CHARGE

## 2024-09-16 ENCOUNTER — HOSPITAL ENCOUNTER (OUTPATIENT)
Dept: RADIOLOGY | Facility: CLINIC | Age: 74
Discharge: HOME | End: 2024-09-16
Payer: COMMERCIAL

## 2024-09-16 DIAGNOSIS — R20.2 PARESTHESIA OF SKIN: ICD-10-CM

## 2024-09-16 DIAGNOSIS — R29.898 OTHER SYMPTOMS AND SIGNS INVOLVING THE MUSCULOSKELETAL SYSTEM: ICD-10-CM

## 2024-09-16 DIAGNOSIS — R26.89 OTHER ABNORMALITIES OF GAIT AND MOBILITY: ICD-10-CM

## 2024-09-16 DIAGNOSIS — H91.91 UNSPECIFIED HEARING LOSS, RIGHT EAR: ICD-10-CM

## 2024-09-16 DIAGNOSIS — M54.50 LOW BACK PAIN, UNSPECIFIED: ICD-10-CM

## 2024-09-16 DIAGNOSIS — M62.569 MUSCLE WASTING AND ATROPHY, NOT ELSEWHERE CLASSIFIED, UNSPECIFIED LOWER LEG: ICD-10-CM

## 2024-09-16 DIAGNOSIS — R29.2 ABNORMAL REFLEX: ICD-10-CM

## 2024-09-16 DIAGNOSIS — R29.6 REPEATED FALLS: ICD-10-CM

## 2024-09-16 PROCEDURE — 70551 MRI BRAIN STEM W/O DYE: CPT | Performed by: RADIOLOGY

## 2024-09-16 PROCEDURE — 72141 MRI NECK SPINE W/O DYE: CPT

## 2024-09-16 PROCEDURE — 72148 MRI LUMBAR SPINE W/O DYE: CPT

## 2024-09-16 PROCEDURE — 72141 MRI NECK SPINE W/O DYE: CPT | Performed by: RADIOLOGY

## 2024-09-16 PROCEDURE — 72148 MRI LUMBAR SPINE W/O DYE: CPT | Performed by: RADIOLOGY

## 2024-09-16 PROCEDURE — 70551 MRI BRAIN STEM W/O DYE: CPT

## 2024-10-17 ENCOUNTER — LAB (OUTPATIENT)
Dept: LAB | Facility: LAB | Age: 74
End: 2024-10-17

## 2024-10-17 DIAGNOSIS — E11.65 TYPE 2 DIABETES MELLITUS WITH HYPERGLYCEMIA, WITHOUT LONG-TERM CURRENT USE OF INSULIN: ICD-10-CM

## 2024-10-17 LAB
ALBUMIN SERPL BCP-MCNC: 3.9 G/DL (ref 3.4–5)
ALP SERPL-CCNC: 92 U/L (ref 33–136)
ALT SERPL W P-5'-P-CCNC: 24 U/L (ref 7–45)
ANION GAP SERPL CALCULATED.3IONS-SCNC: 12 MMOL/L (ref 10–20)
AST SERPL W P-5'-P-CCNC: 17 U/L (ref 9–39)
BILIRUB SERPL-MCNC: 0.8 MG/DL (ref 0–1.2)
BUN SERPL-MCNC: 12 MG/DL (ref 6–23)
CALCIUM SERPL-MCNC: 8.9 MG/DL (ref 8.6–10.3)
CHLORIDE SERPL-SCNC: 100 MMOL/L (ref 98–107)
CO2 SERPL-SCNC: 30 MMOL/L (ref 21–32)
CREAT SERPL-MCNC: 0.6 MG/DL (ref 0.5–1.05)
EGFRCR SERPLBLD CKD-EPI 2021: >90 ML/MIN/1.73M*2
EST. AVERAGE GLUCOSE BLD GHB EST-MCNC: 260 MG/DL
GLUCOSE SERPL-MCNC: 294 MG/DL (ref 74–99)
HBA1C MFR BLD: 10.7 %
POTASSIUM SERPL-SCNC: 4.6 MMOL/L (ref 3.5–5.3)
PROT SERPL-MCNC: 6.4 G/DL (ref 6.4–8.2)
SODIUM SERPL-SCNC: 137 MMOL/L (ref 136–145)

## 2024-10-17 PROCEDURE — 36415 COLL VENOUS BLD VENIPUNCTURE: CPT

## 2024-10-17 PROCEDURE — 80053 COMPREHEN METABOLIC PANEL: CPT

## 2024-10-17 PROCEDURE — 83036 HEMOGLOBIN GLYCOSYLATED A1C: CPT

## 2024-10-21 ENCOUNTER — APPOINTMENT (OUTPATIENT)
Dept: PRIMARY CARE | Facility: CLINIC | Age: 74
End: 2024-10-21
Payer: COMMERCIAL

## 2024-10-21 ENCOUNTER — TELEPHONE (OUTPATIENT)
Dept: PRIMARY CARE | Facility: CLINIC | Age: 74
End: 2024-10-21

## 2024-10-21 ENCOUNTER — CLINICAL SUPPORT (OUTPATIENT)
Dept: PRIMARY CARE | Facility: CLINIC | Age: 74
End: 2024-10-21
Payer: COMMERCIAL

## 2024-10-21 ENCOUNTER — APPOINTMENT (OUTPATIENT)
Dept: CARDIOLOGY | Facility: CLINIC | Age: 74
End: 2024-10-21
Payer: COMMERCIAL

## 2024-10-21 ENCOUNTER — PHARMACY VISIT (OUTPATIENT)
Dept: PHARMACY | Facility: CLINIC | Age: 74
End: 2024-10-21
Payer: COMMERCIAL

## 2024-10-21 VITALS
DIASTOLIC BLOOD PRESSURE: 78 MMHG | SYSTOLIC BLOOD PRESSURE: 118 MMHG | BODY MASS INDEX: 35.33 KG/M2 | OXYGEN SATURATION: 97 % | HEART RATE: 80 BPM | WEIGHT: 192 LBS | HEIGHT: 62 IN

## 2024-10-21 DIAGNOSIS — E11.65 TYPE 2 DIABETES MELLITUS WITH HYPERGLYCEMIA, WITHOUT LONG-TERM CURRENT USE OF INSULIN: Primary | ICD-10-CM

## 2024-10-21 DIAGNOSIS — I10 ESSENTIAL (PRIMARY) HYPERTENSION: ICD-10-CM

## 2024-10-21 DIAGNOSIS — E11.9 TYPE 2 DIABETES MELLITUS WITHOUT COMPLICATION, UNSPECIFIED WHETHER LONG TERM INSULIN USE (MULTI): ICD-10-CM

## 2024-10-21 DIAGNOSIS — R73.01 IMPAIRED FASTING GLUCOSE: ICD-10-CM

## 2024-10-21 PROBLEM — R42 VERTIGO: Status: ACTIVE | Noted: 2024-10-21

## 2024-10-21 PROBLEM — S60.229A CONTUSION OF HAND: Status: RESOLVED | Noted: 2023-07-02 | Resolved: 2024-10-21

## 2024-10-21 PROBLEM — L08.9 LOCAL INFECTION OF WOUND: Status: ACTIVE | Noted: 2024-10-21

## 2024-10-21 PROBLEM — G47.33 OBSTRUCTIVE SLEEP APNEA SYNDROME: Status: ACTIVE | Noted: 2024-10-21

## 2024-10-21 PROBLEM — T14.8XXA LOCAL INFECTION OF WOUND: Status: ACTIVE | Noted: 2024-10-21

## 2024-10-21 PROBLEM — I21.4 ACUTE NON-ST ELEVATION MYOCARDIAL INFARCTION (NSTEMI) (MULTI): Status: ACTIVE | Noted: 2023-06-01

## 2024-10-21 PROBLEM — I25.2 HX OF NON-ST ELEVATION MYOCARDIAL INFARCTION (NSTEMI): Status: ACTIVE | Noted: 2024-10-21

## 2024-10-21 PROBLEM — Z79.85 LONG-TERM (CURRENT) USE OF INJECTABLE NON-INSULIN ANTIDIABETIC DRUGS: Status: ACTIVE | Noted: 2023-07-26

## 2024-10-21 PROBLEM — Z95.5 PRESENCE OF STENT IN CORONARY ARTERY: Status: ACTIVE | Noted: 2023-06-30

## 2024-10-21 PROBLEM — R12 HEARTBURN: Status: ACTIVE | Noted: 2024-10-21

## 2024-10-21 PROBLEM — R73.09 BLOOD GLUCOSE ABNORMAL: Status: ACTIVE | Noted: 2024-10-21

## 2024-10-21 PROCEDURE — 1159F MED LIST DOCD IN RCRD: CPT | Performed by: STUDENT IN AN ORGANIZED HEALTH CARE EDUCATION/TRAINING PROGRAM

## 2024-10-21 PROCEDURE — 3074F SYST BP LT 130 MM HG: CPT | Performed by: STUDENT IN AN ORGANIZED HEALTH CARE EDUCATION/TRAINING PROGRAM

## 2024-10-21 PROCEDURE — 1123F ACP DISCUSS/DSCN MKR DOCD: CPT | Performed by: STUDENT IN AN ORGANIZED HEALTH CARE EDUCATION/TRAINING PROGRAM

## 2024-10-21 PROCEDURE — 1160F RVW MEDS BY RX/DR IN RCRD: CPT | Performed by: STUDENT IN AN ORGANIZED HEALTH CARE EDUCATION/TRAINING PROGRAM

## 2024-10-21 PROCEDURE — 3078F DIAST BP <80 MM HG: CPT | Performed by: STUDENT IN AN ORGANIZED HEALTH CARE EDUCATION/TRAINING PROGRAM

## 2024-10-21 PROCEDURE — 3048F LDL-C <100 MG/DL: CPT | Performed by: STUDENT IN AN ORGANIZED HEALTH CARE EDUCATION/TRAINING PROGRAM

## 2024-10-21 PROCEDURE — 99214 OFFICE O/P EST MOD 30 MIN: CPT | Performed by: STUDENT IN AN ORGANIZED HEALTH CARE EDUCATION/TRAINING PROGRAM

## 2024-10-21 PROCEDURE — 3046F HEMOGLOBIN A1C LEVEL >9.0%: CPT | Performed by: STUDENT IN AN ORGANIZED HEALTH CARE EDUCATION/TRAINING PROGRAM

## 2024-10-21 PROCEDURE — 1126F AMNT PAIN NOTED NONE PRSNT: CPT | Performed by: STUDENT IN AN ORGANIZED HEALTH CARE EDUCATION/TRAINING PROGRAM

## 2024-10-21 PROCEDURE — RXMED WILLOW AMBULATORY MEDICATION CHARGE

## 2024-10-21 PROCEDURE — 4010F ACE/ARB THERAPY RXD/TAKEN: CPT | Performed by: STUDENT IN AN ORGANIZED HEALTH CARE EDUCATION/TRAINING PROGRAM

## 2024-10-21 PROCEDURE — 3008F BODY MASS INDEX DOCD: CPT | Performed by: STUDENT IN AN ORGANIZED HEALTH CARE EDUCATION/TRAINING PROGRAM

## 2024-10-21 RX ORDER — METFORMIN HYDROCHLORIDE 500 MG/1
1000 TABLET, EXTENDED RELEASE ORAL
Qty: 120 TABLET | Refills: 11 | Status: SHIPPED | OUTPATIENT
Start: 2024-10-21

## 2024-10-21 RX ORDER — DAPAGLIFLOZIN 10 MG/1
10 TABLET, FILM COATED ORAL DAILY
Qty: 30 TABLET | Refills: 11 | Status: SHIPPED | OUTPATIENT
Start: 2024-10-21

## 2024-10-21 ASSESSMENT — PAIN SCALES - GENERAL: PAINLEVEL_OUTOF10: 0-NO PAIN

## 2024-10-21 ASSESSMENT — COLUMBIA-SUICIDE SEVERITY RATING SCALE - C-SSRS: 1. IN THE PAST MONTH, HAVE YOU WISHED YOU WERE DEAD OR WISHED YOU COULD GO TO SLEEP AND NOT WAKE UP?: NO

## 2024-10-21 NOTE — PATIENT INSTRUCTIONS
START Metformin XR 500mg.  Take 1 tablet twice daily for 7 days then increase to 2 tablets twice daily   START Farxiga 10mg.  Take 1 tablet once daily.  Follow up with Sybil in 1 month

## 2024-10-23 ENCOUNTER — OFFICE VISIT (OUTPATIENT)
Dept: CARDIOLOGY | Facility: CLINIC | Age: 74
End: 2024-10-23
Payer: COMMERCIAL

## 2024-10-23 VITALS
SYSTOLIC BLOOD PRESSURE: 130 MMHG | OXYGEN SATURATION: 97 % | HEART RATE: 83 BPM | DIASTOLIC BLOOD PRESSURE: 64 MMHG | WEIGHT: 189 LBS | BODY MASS INDEX: 34.57 KG/M2

## 2024-10-23 DIAGNOSIS — I10 ESSENTIAL (PRIMARY) HYPERTENSION: ICD-10-CM

## 2024-10-23 DIAGNOSIS — I25.10 ASHD (ARTERIOSCLEROTIC HEART DISEASE): Primary | ICD-10-CM

## 2024-10-23 DIAGNOSIS — I48.20 CHRONIC ATRIAL FIBRILLATION (MULTI): ICD-10-CM

## 2024-10-23 PROCEDURE — 3075F SYST BP GE 130 - 139MM HG: CPT | Performed by: INTERNAL MEDICINE

## 2024-10-23 PROCEDURE — 1036F TOBACCO NON-USER: CPT | Performed by: INTERNAL MEDICINE

## 2024-10-23 PROCEDURE — 3048F LDL-C <100 MG/DL: CPT | Performed by: INTERNAL MEDICINE

## 2024-10-23 PROCEDURE — 99214 OFFICE O/P EST MOD 30 MIN: CPT | Performed by: INTERNAL MEDICINE

## 2024-10-23 PROCEDURE — 1126F AMNT PAIN NOTED NONE PRSNT: CPT | Performed by: INTERNAL MEDICINE

## 2024-10-23 PROCEDURE — 3046F HEMOGLOBIN A1C LEVEL >9.0%: CPT | Performed by: INTERNAL MEDICINE

## 2024-10-23 PROCEDURE — 4010F ACE/ARB THERAPY RXD/TAKEN: CPT | Performed by: INTERNAL MEDICINE

## 2024-10-23 PROCEDURE — 1123F ACP DISCUSS/DSCN MKR DOCD: CPT | Performed by: INTERNAL MEDICINE

## 2024-10-23 PROCEDURE — 1159F MED LIST DOCD IN RCRD: CPT | Performed by: INTERNAL MEDICINE

## 2024-10-23 PROCEDURE — 3078F DIAST BP <80 MM HG: CPT | Performed by: INTERNAL MEDICINE

## 2024-10-23 RX ORDER — LANOLIN ALCOHOL/MO/W.PET/CERES
1000 CREAM (GRAM) TOPICAL DAILY
COMMUNITY

## 2024-10-23 ASSESSMENT — ENCOUNTER SYMPTOMS
WEIGHT LOSS: 0
DIAPHORESIS: 0
FEVER: 0
PND: 0
LOSS OF SENSATION IN FEET: 0
IRREGULAR HEARTBEAT: 0
DEPRESSION: 0
MYALGIAS: 0
OCCASIONAL FEELINGS OF UNSTEADINESS: 0
WEIGHT GAIN: 0
COUGH: 0
WEAKNESS: 0
ORTHOPNEA: 0
CLAUDICATION: 0
NEAR-SYNCOPE: 0
SYNCOPE: 0
DYSPNEA ON EXERTION: 0
PALPITATIONS: 0
DIZZINESS: 0
SHORTNESS OF BREATH: 0
WHEEZING: 0

## 2024-10-23 ASSESSMENT — PATIENT HEALTH QUESTIONNAIRE - PHQ9
1. LITTLE INTEREST OR PLEASURE IN DOING THINGS: NOT AT ALL
2. FEELING DOWN, DEPRESSED OR HOPELESS: NOT AT ALL
SUM OF ALL RESPONSES TO PHQ9 QUESTIONS 1 AND 2: 0

## 2024-10-23 ASSESSMENT — PAIN SCALES - GENERAL: PAINLEVEL_OUTOF10: 0-NO PAIN

## 2024-10-23 NOTE — PROGRESS NOTES
Subjective      Chief Complaint   Patient presents with    Follow-up        73-year-old female with known atherosclerotic heart disease, moderate nonobstructive disease particularly in her LAD seen on catheterization in 2017. She presented to the hospital June 2023 and was found with an NSTEMI and new onset atrial fibrillation. We adopted a rate control strategy. Regarding the NSTEMI she was treated per ACS protocol. She went to the Cath Lab as part of an early invasive approach and we discovered an occluded mid circumflex. This was amenable to percutaneous intervention we deployed a 2.25 x 28 Synergy everolimus eluding stent. Attempts to post dilate the stent were unsuccessful. The angiographic result was excellent. She had an echocardiogram that showed normal left ventricular size and function and trivial valvular disease. Regarding her anticoagulant/antiplatelet regimen we discharged her on Eliquis Plavix and aspirin, stopped the aspirin 3 weeks later. She was seen in the office, still in AF. Plan was for 1m f/u to decide on DCCV.  She was last seen in August, she was having some issues with falling.         Review of Systems   Constitutional: Negative for diaphoresis, fever, weight gain and weight loss.   Eyes:  Negative for visual disturbance.   Cardiovascular:  Negative for chest pain, claudication, dyspnea on exertion, irregular heartbeat, leg swelling, near-syncope, orthopnea, palpitations, paroxysmal nocturnal dyspnea and syncope.   Respiratory:  Negative for cough, shortness of breath and wheezing.    Musculoskeletal:  Negative for muscle weakness and myalgias.   Neurological:  Negative for dizziness and weakness.   All other systems reviewed and are negative.       Past Medical History:   Diagnosis Date    ASHD (arteriosclerotic heart disease)     Atrial fibrillation (Multi)     Contusion of hand 07/02/2023        Past Surgical History:   Procedure Laterality Date    CORONARY ANGIOPLASTY WITH STENT  PLACEMENT      MR HEAD ANGIO WO IV CONTRAST  06/03/2021    MR HEAD ANGIO WO IV CONTRAST [facility]        Social History     Socioeconomic History    Marital status:      Spouse name: Not on file    Number of children: Not on file    Years of education: Not on file    Highest education level: Not on file   Occupational History    Not on file   Tobacco Use    Smoking status: Never    Smokeless tobacco: Never   Vaping Use    Vaping status: Never Used   Substance and Sexual Activity    Alcohol use: Not Currently    Drug use: Never    Sexual activity: Not on file   Other Topics Concern    Not on file   Social History Narrative    Not on file     Social Drivers of Health     Financial Resource Strain: Not on file   Food Insecurity: Not on file   Transportation Needs: Not on file   Physical Activity: Not on file   Stress: Not on file   Social Connections: Not on file   Intimate Partner Violence: Not on file   Housing Stability: Not on file        Family History   Problem Relation Name Age of Onset    Heart attack Mother          OBJECTIVE:    Vitals:    10/23/24 1100   BP: 130/64   Pulse: 83   SpO2: 97%        Vitals reviewed.   Constitutional:       Appearance: Normal and healthy appearance. Not in distress.   Pulmonary:      Effort: Pulmonary effort is normal.      Breath sounds: Normal breath sounds.   Cardiovascular:      Normal rate. Regular rhythm. Normal S1. Normal S2.       Murmurs: There is no murmur.      No gallop.  No click.   Pulses:     Intact distal pulses.   Edema:     Peripheral edema absent.   Skin:     General: Skin is warm and dry.   Neurological:      General: No focal deficit present.          Lab Review:   Lab Results   Component Value Date     10/17/2024    K 4.6 10/17/2024     10/17/2024    CO2 30 10/17/2024    BUN 12 10/17/2024    CREATININE 0.60 10/17/2024    GLUCOSE 294 (H) 10/17/2024    CALCIUM 8.9 10/17/2024     Lab Results   Component Value Date    CHOL 112 (L)  08/13/2024    TRIG 211 (H) 08/13/2024    HDL 49.0 (L) 08/13/2024       Lab Results   Component Value Date    LDLCALC 21 (L) 08/13/2024        Essential (primary) hypertension  Controlled. Continue lisinopril    Chronic atrial fibrillation (Multi)  Continue BB and AC. Stable    ASHD (arteriosclerotic heart disease)  Stable without angina. Can stop plavix and just use eliquis at this point. Lipids from August reviewed, LDL is well below goal

## 2024-10-23 NOTE — ASSESSMENT & PLAN NOTE
Stable without angina. Can stop plavix and just use eliquis at this point. Lipids from August reviewed, LDL is well below goal

## 2024-11-14 PROCEDURE — RXMED WILLOW AMBULATORY MEDICATION CHARGE

## 2024-11-15 ENCOUNTER — PHARMACY VISIT (OUTPATIENT)
Dept: PHARMACY | Facility: CLINIC | Age: 74
End: 2024-11-15
Payer: COMMERCIAL

## 2024-11-20 ENCOUNTER — APPOINTMENT (OUTPATIENT)
Dept: AUDIOLOGY | Facility: CLINIC | Age: 74
End: 2024-11-20
Payer: COMMERCIAL

## 2024-11-20 ENCOUNTER — CLINICAL SUPPORT (OUTPATIENT)
Dept: AUDIOLOGY | Facility: CLINIC | Age: 74
End: 2024-11-20
Payer: COMMERCIAL

## 2024-11-20 DIAGNOSIS — H81.11 BENIGN PAROXYSMAL POSITIONAL VERTIGO OF RIGHT EAR: Primary | ICD-10-CM

## 2024-11-20 DIAGNOSIS — H90.3 SENSORINEURAL HEARING LOSS (SNHL) OF BOTH EARS: ICD-10-CM

## 2024-11-20 PROCEDURE — 92542 POSITIONAL NYSTAGMUS TEST: CPT | Performed by: AUDIOLOGIST

## 2024-11-20 PROCEDURE — 92550 TYMPANOMETRY & REFLEX THRESH: CPT | Performed by: AUDIOLOGIST

## 2024-11-20 PROCEDURE — 92557 COMPREHENSIVE HEARING TEST: CPT | Performed by: AUDIOLOGIST

## 2024-11-20 NOTE — PROGRESS NOTES
"  AUDIOLOGY ADULT AUDIOMETRIC EVALUATION    Name:  Kristi Jackson  :  1950  Age:  74 y.o.  Date of Evaluation:  2024    Reason for visit: Kristi  is seen in the clinic today for an audiologic evaluation.     HISTORY  Patient reports at the end of October she walked into our office of [facility] to try and schedule an appointment for two issues.     Patient reports since 2024 she has had THREE falls.    The first fall in January, she reports she was at [employer] and tipped her head up to reach something on a shelf and fell backwards and hit back of head and broke her elbow requiring surgery.    She reports in 2024 she was picking up sticks and fell again hurting shoulder and hitting head.   She fell once more later in summer and was able to manage not hitting her head.    She reports that any time she turns her head to the right  she has an intense spinning sensation, losing balance; can precipitate a fall.  Reported history strongly suggestive for possible BPPV. She reports she was sent to Neurology; seen by Alesha Kenney CNP.    MRI performed and reportedly negative for significant findings.   She is also under cardiovascular care with Timym Rodriguez DO.    She also has type II diabetes.     The other issue she is here for is report of a gradual decrease in hearing ability bilaterally.   This has been gradual.   She reports she ordered some \"amplifiers\" on-line; she did not do well with them.   \"Picks up everything\".    She has not had a formal audiologic evaluation.  No ear fullness.  She has occasional \"crackling\" sound/ringing bilaterally.      EVALUATION  See scanned audiogram: “Media” > “Audiology Report”.      RESULTS  Otoscopic Evaluation:  Right Ear: clear ear canal  Left Ear: clear ear canal    Immittance Measures:  Tympanometry:  Right Ear: Type As, reduced tympanic membrane mobility with normal middle ear pressure  Left Ear: Type A, normal tympanic " "membrane mobility with normal middle ear pressure    Acoustic Reflexes:  Ipsilateral Right Ear: Acoustic reflexes present within normal limits 500Hz through 4KHz   Ipsilateral Left Ear: Acoustic reflexes present within normal limits 500Hz through 4KHz   Contralateral Right Ear: did not evaluate  Contralateral Left Ear: did not evaluate    Distortion Product Otoacoustic Emissions (DPOAEs):  Right Ear: Refer at all frequencies 1KHz-8KHz   Left Ear: Refer at all frequencies 1KHz-8KHz     Audiometry:  Test Technique and Reliability:   Standard audiometry via supra-aural headphones. Reliability is good.    Pure tone air and bone conduction audiometry:  Mild to moderate sensorineural hearing loss 125Hz through 8KHz    Speech Audiometry (Word Recognition Scores):   Right Ear: Excellent at most comfortable listening level of loudness of 80dBHL  Left Ear: Excellent at most comfortable listening level of loudness of 80dBHL    Hallpike head right demonstrated strong, classic BPPV; rotary nystagmus with patient report of spinning dizziness.    Repositioned 2x.   Please see BPPV note.       IMPRESSIONS    Mild to moderate sensorineural hearing loss bilaterally    Positive for BPPV head right ; repositioned 2x;  significant decrease in nystagmus/dizziness for 2nd Hallpike post 1st repositioning.    *please see BPPV note    RECOMMENDATIONS  - Discussed audiogram at length; needs hearing aids; discussed options       *patient will check her insurance for possible hearing aid benefit       *will contact our office for other options for proper fitting of hearing aids  - Annual audiologic evaluation, sooner if an acute change is noted.  - Follow-up with medical care team as planned.    Discussed the pathophysiology of BPPV.   Recommended for a few days that patient be careful with head and body movement that would normally trigger the dizziness.  Patient may feel a little \"off\" the next few days post repositioning.        PATIENT " EDUCATION  Discussed results, impressions and recommendations with the patient. Questions were addressed and the patient was encouraged to contact our office should concerns arise.    Time for this encounter: 517/355    Sandra Yeboah M.A., CCC/A   Licensed Audiologist

## 2024-11-20 NOTE — PROGRESS NOTES
"   EVALUATION OF BENIGN POSITIONAL PAROXYSMAL VERTIGO BPPV    HISTORY:  Patient reports since January of 2024 she has had THREE falls.    The first fall in January, she reports she was at [employer] and tipped her head up to reach something on a shelf and fell backwards and hit back of head and broke her elbow requiring surgery.    She reports in June of 2024 she was picking up sticks and fell again hurting shoulder and hitting head.   She fell once more later in summer and was able to manage not hitting her head.    She reports that any time she turns her head to the right  she has an intense spinning sensation, losing balance; can precipitate a fall.  Reported history strongly suggestive for possible BPPV. She reports she was sent to Neurology; seen by Alesha Kenney CNP.    MRI performed and reportedly negative for significant findings.   She is also under cardiovascular care with Timmy Rodriguez DO.    She also has type II diabetes.     Her audiogram today demonstrated a symmetrical mild to moderate sensorineural hearing loss.      EVALUATION: Hallpike head right demonstrated strong, classic BPPV.   Patient reported significant spinning dizziness; subsided within 20 seconds.    She became very \"hot\" in response to dizziness.  Proceeded with canalith repositioning.  Significant dizziness, nystagmus demonstrated throughout maneuver.       Waited about ten minutes; repeat Hallpike head right demonstrated significant reduction of nystagmus and dizziness; repositioned once more.   *no significant dizziness throughout 2nd maneuver.        Discussed the pathophysiology of BPPV.   Recommended for a few days that the patient be careful with head and body movement that would normally trigger the dizziness.  Patient may feel a little \"off\" the next few days post repositioning.     RECOMMENDATIONS:   Follow up as needed for repositioning.   Call with any any further questions or concerns.      Sandra Yeboah M.A., CCC/A "   Vestibular Audiologist

## 2024-11-20 NOTE — LETTER
"  AUDIOLOGY ADULT AUDIOMETRIC EVALUATION    Name:  Kristi Jackson  :  1950  Age:  74 y.o.  Date of Evaluation:  2024    Reason for visit: Kristi  is seen in the clinic today for an audiologic evaluation.     HISTORY  Patient reports at the end of October she walked into our office of [facility] to try and schedule an appointment for two issues.     Patient reports since 2024 she has had THREE falls.    The first fall in January, she reports she was at [employer] and tipped her head up to reach something on a shelf and fell backwards and hit back of head and broke her elbow requiring surgery.    She reports in 2024 she was picking up sticks and fell again hurting shoulder and hitting head.   She fell once more later in summer and was able to manage not hitting her head.    She reports that any time she turns her head to the right  she has an intense spinning sensation, losing balance; can precipitate a fall.  Reported history strongly suggestive for possible BPPV. She reports she was sent to Neurology; seen by Alesha Kenney CNP.    MRI performed and reportedly negative for significant findings.   She is also under cardiovascular care with Timmy Rodriguez DO.    She also has type II diabetes.     The other issue she is here for is report of a gradual decrease in hearing ability bilaterally.   This has been gradual.   She reports she ordered some \"amplifiers\" on-line; she did not do well with them.   \"Picks up everything\".    She has not had a formal audiologic evaluation.  No ear fullness.  She has occasional \"crackling\" sound/ringing bilaterally.      EVALUATION  See scanned audiogram: “Media” > “Audiology Report”.      RESULTS  Otoscopic Evaluation:  Right Ear: clear ear canal  Left Ear: clear ear canal    Immittance Measures:  Tympanometry:  Right Ear: Type As, reduced tympanic membrane mobility with normal middle ear pressure  Left Ear: Type A, normal tympanic " "membrane mobility with normal middle ear pressure    Acoustic Reflexes:  Ipsilateral Right Ear: Acoustic reflexes present within normal limits 500Hz through 4KHz   Ipsilateral Left Ear: Acoustic reflexes present within normal limits 500Hz through 4KHz   Contralateral Right Ear: did not evaluate  Contralateral Left Ear: did not evaluate    Distortion Product Otoacoustic Emissions (DPOAEs):  Right Ear: Refer at all frequencies 1KHz-8KHz   Left Ear: Refer at all frequencies 1KHz-8KHz     Audiometry:  Test Technique and Reliability:   Standard audiometry via supra-aural headphones. Reliability is good.    Pure tone air and bone conduction audiometry:  Mild to moderate sensorineural hearing loss 125Hz through 8KHz    Speech Audiometry (Word Recognition Scores):   Right Ear: Excellent at most comfortable listening level of loudness of 80dBHL  Left Ear: Excellent at most comfortable listening level of loudness of 80dBHL    Hallpike head right demonstrated strong, classic BPPV; rotary nystagmus with patient report of spinning dizziness.    Repositioned 2x.   Please see BPPV note.       IMPRESSIONS    Mild to moderate sensorineural hearing loss bilaterally    Positive for BPPV head right ; repositioned 2x;  significant decrease in nystagmus/dizziness for 2nd Hallpike post 1st repositioning.    *please see BPPV note    RECOMMENDATIONS  - Discussed audiogram at length; needs hearing aids; discussed options       *patient will check her insurance for possible hearing aid benefit       *will contact our office for other options for proper fitting of hearing aids  - Annual audiologic evaluation, sooner if an acute change is noted.  - Follow-up with medical care team as planned.    Discussed the pathophysiology of BPPV.   Recommended for a few days that patient be careful with head and body movement that would normally trigger the dizziness.  Patient may feel a little \"off\" the next few days post repositioning.        PATIENT " EDUCATION  Discussed results, impressions and recommendations with the patient. Questions were addressed and the patient was encouraged to contact our office should concerns arise.    Time for this encounter: 381/495    Sandra Yeboah M.A., CCC/A   Licensed Audiologist

## 2024-11-21 ENCOUNTER — APPOINTMENT (OUTPATIENT)
Dept: PRIMARY CARE | Facility: CLINIC | Age: 74
End: 2024-11-21
Payer: COMMERCIAL

## 2024-11-21 DIAGNOSIS — E11.65 TYPE 2 DIABETES MELLITUS WITH HYPERGLYCEMIA, WITHOUT LONG-TERM CURRENT USE OF INSULIN: ICD-10-CM

## 2024-11-21 NOTE — PROGRESS NOTES
DM FOLLOW UP  E11.9    Kristi Jackson presents to the office for her DM review. Referring Provider: Lacy Gil MD     CURRENT DM PHARMACOTHERAPY   Farxiga 10mg daily     Pt denies SE/intolerances.  Reviewed all medications by prescribing practitioner (such as prescriptions, OTCs, herbal therapies and supplements) and documented in the medical record.         Primary/Secondary Prevention   - Statin? Yes  - ACE-I/ARB? Yes  - Aspirin? No    Summary of CGM Findings:  Patient is using: glucometer  The patient is currently checking the blood glucose 1 times per day.  Recent 's         Last Labs/Vitals/Meds  POC HEMOGLOBIN A1c (%)   Date Value   07/19/2024 10.5 (A)   04/03/2024 9.1 (A)   01/03/2024 7.5 (A)     Hemoglobin A1C (%)   Date Value   10/17/2024 10.7 (H)   08/13/2024 11.5 (H)     Glucose (mg/dL)   Date Value   10/17/2024 294 (H)     Creatinine (mg/dL)   Date Value   10/17/2024 0.60     eGFR (mL/min/1.73m*2)   Date Value   10/17/2024 >90       BP Readings from Last 6 Encounters:   10/23/24 130/64   10/21/24 118/78   08/12/24 98/60   07/19/24 118/72   06/05/24 134/73   05/31/24 140/78        Wt Readings from Last 6 Encounters:   10/23/24 85.7 kg (189 lb)   10/21/24 87.1 kg (192 lb)   09/16/24 87.1 kg (192 lb)   08/12/24 86.2 kg (190 lb)   07/19/24 88 kg (194 lb)   05/31/24 88 kg (194 lb)       Current Outpatient Medications on File Prior to Visit   Medication Sig Dispense Refill    apixaban (Eliquis) 5 mg tablet TAKE 1 TABLET BY MOUTH TWO TIMES A  tablet 3    cyanocobalamin (Vitamin B-12) 1,000 mcg tablet Take 1 tablet (1,000 mcg) by mouth once daily.      DULoxetine (Cymbalta) 60 mg DR capsule TAKE 1 CAPSULE DAILY 90 capsule 3    Farxiga 10 mg Take 1 tablet (10 mg) by mouth once daily. Hold if not eating or drinking normally. 30 tablet 11    folic acid/multivit-min/lutein (CENTRUM SILVER ORAL) Take 1 tablet by mouth once daily.      lisinopril 20 mg tablet TAKE 1 TABLET DAILY 90 tablet 1     "metoprolol tartrate (Lopressor) 25 mg tablet TAKE 2 TABLETS TWICE A DAY WITH FOOD 360 tablet 3    nitroglycerin (Nitrostat) 0.4 mg SL tablet Instructions: PLACE 1 TABLET UNDER THE TONGUE AS NEEDED FOR CHEST PAIN. MAY REPEAT EVERY FIVE MINUTES X 3.      OneTouch Verio test strips strip USE TO TEST SUGAR THREE TIMES A  strip 3    rosuvastatin (Crestor) 20 mg tablet TAKE 1 TABLET DAILY 90 tablet 3    [DISCONTINUED] clopidogrel (Plavix) 75 mg tablet TAKE 1 TABLET DAILY (Patient not taking: Reported on 10/23/2024) 90 tablet 3    [DISCONTINUED] metFORMIN XR (Glucophage-XR) 500 mg 24 hr tablet Take two tablets by mouth twice daily with food.  Do not crush, chew, or split. 120 tablet 11     No current facility-administered medications on file prior to visit.       Lifestyle:  Current diet: diabetic she reports trying to eat healthier  Current exercise: no regular exercise, pt has had 3 falls in the past year due to vertigo.  Discussed walking with cane in public places to help balance.  Pt states she does have a cane but left in her car today.      Assessment/Plan:   Pt reports feeling \"better\" since stopping metformin and starting SGLT2  Discussed continue Farxiga.  Instructed to hold Farxiga if not eating/drinking normally to prevent dehydration.  Pt expressed understanding.    OV with PCP scheduled for January.  Suggest follow up with me in April 2025  Discussed UHPAP.  Pt is uncertain about her 's income but will keep this in mind.      Plan:   Continue all meds under the continuation of care with the referring provider and clinical pharmacy team.  Follow up in April for A1c check     Data reviewed and evaluated by ENRIQUE Smith RP, [facility]  Treatment and plan changes discussed with Lacy Gil MD  "

## 2024-12-10 DIAGNOSIS — E11.65 TYPE 2 DIABETES MELLITUS WITH HYPERGLYCEMIA, WITHOUT LONG-TERM CURRENT USE OF INSULIN: ICD-10-CM

## 2024-12-10 PROCEDURE — RXMED WILLOW AMBULATORY MEDICATION CHARGE

## 2024-12-10 RX ORDER — METFORMIN HYDROCHLORIDE 500 MG/1
1000 TABLET, EXTENDED RELEASE ORAL
Qty: 120 TABLET | Refills: 11 | Status: CANCELLED | OUTPATIENT
Start: 2024-12-10

## 2024-12-11 DIAGNOSIS — E11.65 TYPE 2 DIABETES MELLITUS WITH HYPERGLYCEMIA, WITHOUT LONG-TERM CURRENT USE OF INSULIN: ICD-10-CM

## 2024-12-11 RX ORDER — METFORMIN HYDROCHLORIDE 500 MG/1
1000 TABLET, EXTENDED RELEASE ORAL
Qty: 120 TABLET | Refills: 11 | OUTPATIENT
Start: 2024-12-11

## 2024-12-11 RX ORDER — METFORMIN HYDROCHLORIDE 500 MG/1
1000 TABLET, EXTENDED RELEASE ORAL
Qty: 120 TABLET | Refills: 11 | Status: CANCELLED | OUTPATIENT
Start: 2024-12-10

## 2024-12-12 DIAGNOSIS — R73.01 IMPAIRED FASTING GLUCOSE: Primary | ICD-10-CM

## 2024-12-12 DIAGNOSIS — E11.65 INADEQUATELY CONTROLLED DIABETES MELLITUS (MULTI): ICD-10-CM

## 2024-12-12 PROCEDURE — RXMED WILLOW AMBULATORY MEDICATION CHARGE

## 2024-12-12 RX ORDER — METFORMIN HYDROCHLORIDE 500 MG/1
TABLET, EXTENDED RELEASE ORAL
Qty: 360 TABLET | Refills: 1 | Status: SHIPPED | OUTPATIENT
Start: 2024-12-12

## 2024-12-12 NOTE — PROGRESS NOTES
Spoke with patient.  Reports that she is doing well with Metformin XR 500mg 2 tabs bid.  Needs refills sent to [facility].      Pt states Farxiga is too expensive on insurance plan.  $272/30 days.  Jardiance is preferred and copay is $25.  Will change to Jardiance for cost savings.      Pt informed and aware of changes.  She expresses understanding.      Sybil Smith  Clinical Pharmacist  [facility]  [phone]

## 2024-12-17 ENCOUNTER — PHARMACY VISIT (OUTPATIENT)
Dept: PHARMACY | Facility: CLINIC | Age: 74
End: 2024-12-17
Payer: COMMERCIAL

## 2025-01-21 ENCOUNTER — TELEPHONE (OUTPATIENT)
Dept: CARDIOLOGY | Facility: CLINIC | Age: 75
End: 2025-01-21
Payer: COMMERCIAL

## 2025-01-21 NOTE — TELEPHONE ENCOUNTER
Pt called and said their insurance is not covering eliquis but will cover dabigatran. Asking for prescription for that. Please advise

## 2025-01-23 ENCOUNTER — LAB (OUTPATIENT)
Dept: LAB | Facility: LAB | Age: 75
End: 2025-01-23
Payer: COMMERCIAL

## 2025-01-23 DIAGNOSIS — E11.65 TYPE 2 DIABETES MELLITUS WITH HYPERGLYCEMIA, WITHOUT LONG-TERM CURRENT USE OF INSULIN: ICD-10-CM

## 2025-01-23 DIAGNOSIS — E11.9 TYPE 2 DIABETES MELLITUS WITHOUT COMPLICATION, UNSPECIFIED WHETHER LONG TERM INSULIN USE (MULTI): ICD-10-CM

## 2025-01-23 LAB
ALBUMIN SERPL BCP-MCNC: 4.1 G/DL (ref 3.4–5)
ALP SERPL-CCNC: 63 U/L (ref 33–136)
ALT SERPL W P-5'-P-CCNC: 22 U/L (ref 7–45)
ANION GAP SERPL CALCULATED.3IONS-SCNC: 13 MMOL/L (ref 10–20)
APPEARANCE UR: CLEAR
AST SERPL W P-5'-P-CCNC: 16 U/L (ref 9–39)
BACTERIA #/AREA URNS AUTO: ABNORMAL /HPF
BILIRUB SERPL-MCNC: 0.5 MG/DL (ref 0–1.2)
BILIRUB UR STRIP.AUTO-MCNC: NEGATIVE MG/DL
BUN SERPL-MCNC: 17 MG/DL (ref 6–23)
CALCIUM SERPL-MCNC: 9.3 MG/DL (ref 8.6–10.3)
CHLORIDE SERPL-SCNC: 104 MMOL/L (ref 98–107)
CO2 SERPL-SCNC: 28 MMOL/L (ref 21–32)
COLOR UR: COLORLESS
CREAT SERPL-MCNC: 0.77 MG/DL (ref 0.5–1.05)
CREAT UR-MCNC: 38.3 MG/DL (ref 20–320)
EGFRCR SERPLBLD CKD-EPI 2021: 81 ML/MIN/1.73M*2
EST. AVERAGE GLUCOSE BLD GHB EST-MCNC: 200 MG/DL
GLUCOSE SERPL-MCNC: 191 MG/DL (ref 74–99)
GLUCOSE UR STRIP.AUTO-MCNC: ABNORMAL MG/DL
HBA1C MFR BLD: 8.6 %
KETONES UR STRIP.AUTO-MCNC: NEGATIVE MG/DL
LEUKOCYTE ESTERASE UR QL STRIP.AUTO: ABNORMAL
MICROALBUMIN UR-MCNC: 7.3 MG/L
MICROALBUMIN/CREAT UR: 19.1 UG/MG CREAT
MUCOUS THREADS #/AREA URNS AUTO: ABNORMAL /LPF
NITRITE UR QL STRIP.AUTO: ABNORMAL
PH UR STRIP.AUTO: 5 [PH]
POTASSIUM SERPL-SCNC: 5 MMOL/L (ref 3.5–5.3)
PROT SERPL-MCNC: 6.2 G/DL (ref 6.4–8.2)
PROT UR STRIP.AUTO-MCNC: NEGATIVE MG/DL
RBC # UR STRIP.AUTO: NEGATIVE /UL
RBC #/AREA URNS AUTO: ABNORMAL /HPF
SODIUM SERPL-SCNC: 140 MMOL/L (ref 136–145)
SP GR UR STRIP.AUTO: 1.03
UROBILINOGEN UR STRIP.AUTO-MCNC: NORMAL MG/DL
WBC #/AREA URNS AUTO: ABNORMAL /HPF

## 2025-01-23 PROCEDURE — 83036 HEMOGLOBIN GLYCOSYLATED A1C: CPT

## 2025-01-23 PROCEDURE — 80053 COMPREHEN METABOLIC PANEL: CPT

## 2025-01-23 PROCEDURE — 82043 UR ALBUMIN QUANTITATIVE: CPT

## 2025-01-23 PROCEDURE — RXMED WILLOW AMBULATORY MEDICATION CHARGE

## 2025-01-23 PROCEDURE — 82570 ASSAY OF URINE CREATININE: CPT

## 2025-01-23 PROCEDURE — 81001 URINALYSIS AUTO W/SCOPE: CPT

## 2025-01-27 ENCOUNTER — TELEMEDICINE (OUTPATIENT)
Dept: PHARMACY | Facility: HOSPITAL | Age: 75
End: 2025-01-27
Payer: COMMERCIAL

## 2025-01-27 ENCOUNTER — APPOINTMENT (OUTPATIENT)
Dept: PRIMARY CARE | Facility: CLINIC | Age: 75
End: 2025-01-27
Payer: COMMERCIAL

## 2025-01-27 VITALS
DIASTOLIC BLOOD PRESSURE: 76 MMHG | BODY MASS INDEX: 35.51 KG/M2 | HEIGHT: 62 IN | WEIGHT: 193 LBS | OXYGEN SATURATION: 98 % | HEART RATE: 87 BPM | SYSTOLIC BLOOD PRESSURE: 120 MMHG

## 2025-01-27 DIAGNOSIS — I48.20 CHRONIC ATRIAL FIBRILLATION (MULTI): ICD-10-CM

## 2025-01-27 DIAGNOSIS — I48.20 CHRONIC ATRIAL FIBRILLATION (MULTI): Primary | ICD-10-CM

## 2025-01-27 DIAGNOSIS — E11.9 TYPE 2 DIABETES MELLITUS WITHOUT COMPLICATION, WITHOUT LONG-TERM CURRENT USE OF INSULIN (MULTI): ICD-10-CM

## 2025-01-27 DIAGNOSIS — I48.91 ATRIAL FIBRILLATION WITH RVR (MULTI): ICD-10-CM

## 2025-01-27 DIAGNOSIS — E11.65 TYPE 2 DIABETES MELLITUS WITH HYPERGLYCEMIA, WITHOUT LONG-TERM CURRENT USE OF INSULIN: Primary | ICD-10-CM

## 2025-01-27 DIAGNOSIS — E11.65 INADEQUATELY CONTROLLED DIABETES MELLITUS (MULTI): ICD-10-CM

## 2025-01-27 PROCEDURE — 3074F SYST BP LT 130 MM HG: CPT | Performed by: STUDENT IN AN ORGANIZED HEALTH CARE EDUCATION/TRAINING PROGRAM

## 2025-01-27 PROCEDURE — 1159F MED LIST DOCD IN RCRD: CPT | Performed by: STUDENT IN AN ORGANIZED HEALTH CARE EDUCATION/TRAINING PROGRAM

## 2025-01-27 PROCEDURE — 4010F ACE/ARB THERAPY RXD/TAKEN: CPT | Performed by: STUDENT IN AN ORGANIZED HEALTH CARE EDUCATION/TRAINING PROGRAM

## 2025-01-27 PROCEDURE — 1126F AMNT PAIN NOTED NONE PRSNT: CPT | Performed by: STUDENT IN AN ORGANIZED HEALTH CARE EDUCATION/TRAINING PROGRAM

## 2025-01-27 PROCEDURE — 99214 OFFICE O/P EST MOD 30 MIN: CPT | Performed by: STUDENT IN AN ORGANIZED HEALTH CARE EDUCATION/TRAINING PROGRAM

## 2025-01-27 PROCEDURE — 3008F BODY MASS INDEX DOCD: CPT | Performed by: STUDENT IN AN ORGANIZED HEALTH CARE EDUCATION/TRAINING PROGRAM

## 2025-01-27 PROCEDURE — 3061F NEG MICROALBUMINURIA REV: CPT | Performed by: STUDENT IN AN ORGANIZED HEALTH CARE EDUCATION/TRAINING PROGRAM

## 2025-01-27 PROCEDURE — 3052F HG A1C>EQUAL 8.0%<EQUAL 9.0%: CPT | Performed by: STUDENT IN AN ORGANIZED HEALTH CARE EDUCATION/TRAINING PROGRAM

## 2025-01-27 PROCEDURE — 1123F ACP DISCUSS/DSCN MKR DOCD: CPT | Performed by: STUDENT IN AN ORGANIZED HEALTH CARE EDUCATION/TRAINING PROGRAM

## 2025-01-27 PROCEDURE — 3078F DIAST BP <80 MM HG: CPT | Performed by: STUDENT IN AN ORGANIZED HEALTH CARE EDUCATION/TRAINING PROGRAM

## 2025-01-27 ASSESSMENT — PATIENT HEALTH QUESTIONNAIRE - PHQ9
1. LITTLE INTEREST OR PLEASURE IN DOING THINGS: SEVERAL DAYS
2. FEELING DOWN, DEPRESSED OR HOPELESS: SEVERAL DAYS
SUM OF ALL RESPONSES TO PHQ9 QUESTIONS 1 AND 2: 2

## 2025-01-27 ASSESSMENT — PAIN SCALES - GENERAL: PAINLEVEL_OUTOF10: 0-NO PAIN

## 2025-01-27 NOTE — PROGRESS NOTES
Subjective   Kristi Jackson is a 74 y.o. female who presents for Diabetes Follow up    Complains of continuous PND. Has tried claritin, xyzal, tylenol sinus    HPI   T2DM:  Current medication regimen:   Jardiance 25 mg  Metformin  mg daily   --- discontinued after was forgetting to take  Blood sugars at home averaging 170-200's. Checks every morning, but sometimes in the afternoons  Diet has been better  Drinks water, diet pepsi, unsweetened tea. Cut back on carbohydrates - not eating bread, little to no sweets  CGM? no - not covered by insurance  Hypoglycemic episodes no  Last A1C : 8.6, 10.7, 11.5, 10.5 POC, 9.1 POC, 7.5 POC, 7.1, 5.9   Last eGFR - 81  Last Creatinine - 0.77  Last microalbumin -   -Last eye exam - Dr. Cavanaugh - will be changing to someone else due to insurance issue  Follows with podiatry no  - Statin? Yes - known ASCVD  - ACE-I/ARB? Yes  - Aspirin? No  - Last diabetic foot exam:  - Pneumococcal vaccine? Done  RSV, covid, flu done       LAB REVIEW   POC HEMOGLOBIN A1c (%)   Date Value   07/19/2024 10.5 (A)   04/03/2024 9.1 (A)   01/03/2024 7.5 (A)     Hemoglobin A1C (%)   Date Value   01/23/2025 8.6 (H)   10/17/2024 10.7 (H)   08/13/2024 11.5 (H)     Glucose (mg/dL)   Date Value   01/23/2025 191 (H)   10/17/2024 294 (H)   08/13/2024 270 (H)     Creatinine (mg/dL)   Date Value   01/23/2025 0.77   10/17/2024 0.60   08/13/2024 0.70     eGFR (mL/min/1.73m*2)   Date Value   01/23/2025 81   10/17/2024 >90   08/13/2024 >90     Lab Results   Component Value Date    ALBUR 12 06/29/2023    CREATININE 0.77 01/23/2025     Lab Results   Component Value Date    CHOL 112 (L) 08/13/2024    CHOL 125 (L) 06/03/2023    CHOL 161 06/02/2023     Lab Results   Component Value Date    HDL 49.0 (L) 08/13/2024    HDL 40 (L) 06/03/2023    HDL 45 (L) 06/02/2023     Lab Results   Component Value Date    LDLCALC 21 (L) 08/13/2024    LDLCALC 55 (L) 06/03/2023    LDLCALC 82 06/02/2023     Lab Results   Component Value  "Date    TRIG 211 (H) 08/13/2024    TRIG 149 06/03/2023    TRIG 171 (H) 06/02/2023       ROS otherwise negative aside from what was mentioned above in HPI.      Objective   /76 (BP Location: Left arm, Patient Position: Sitting, BP Cuff Size: Large adult)   Pulse 87   Ht 1.575 m (5' 2\")   Wt 87.5 kg (193 lb)   LMP  (LMP Unknown)   SpO2 98%   BMI 35.30 kg/m²     Physical Exam  Vitals and nursing note reviewed.   Constitutional:       General: She is not in acute distress.     Appearance: She is not ill-appearing.   Cardiovascular:      Rate and Rhythm: Normal rate and regular rhythm.      Heart sounds: Normal heart sounds. No murmur heard.  Pulmonary:      Effort: Pulmonary effort is normal. No respiratory distress.      Breath sounds: Normal breath sounds. No wheezing, rhonchi or rales.   Musculoskeletal:      Right lower leg: No edema.      Left lower leg: No edema.         Assessment/Plan   1. Type 2 diabetes mellitus with hyperglycemia, without long-term current use of insulin (Primary)  Improved control but still not at goal of A1c <7   Continue current medication regimen. She declines medication titration at this time.   Patient is on ACEi/ARB, and statin  Routine diabetic retinopathy screening up to date   Discussed intensifying dietary efforts and physical activity  Follow up 3 mos        "

## 2025-01-27 NOTE — PROGRESS NOTES
Please review for internal [employer] (VAF) eligibility. Prescriptions have been sent to [facility].     Kristi Renetta  1950   [id number]  [phone]   [email or web address]  Delivery Preference (if known): MAIL  Priority:  Hold Medication until [facility] approved/denied  Filing Status: Patient does file taxes  Household size: 2  Financial Documents To Be Collected By: Documents attached to email  Medication(s):    Eliquis, Jardiance    *I have confirmed the above medications are listed on the current VAF formulary: [email or web address]    I acknowledge the [facility] staff will further reach out to the patient to confirm additional details as needed, including but not limited to collecting financial documentation, confirming delivery information, obtaining payment method for non-VAF medications.      Sybil Smith [facility]

## 2025-01-27 NOTE — PROGRESS NOTES
MEDICATION MANAGEMENT    Kristi Jackson is a 74 y.o. female who was referred by Lacy Gil MD to complete medication reconciliation and review with the clinical pharmacist.  A comprehensive medication review was completed with the patient via telephone today.  With patient's permission, this is a Telemedicine visit with audio. Provider located at office address. Patient located at their home address. All issues as documented below were discussed and addressed but limited physical exam was performed. If it was felt that the patient should be evaluated via face-to-face office appointment(s) they were directed to appropriate location.  Patient reports financial barrier with current medication.      MEDICATION HISTORY  Allergies   Allergen Reactions    Metformin Diarrhea     Pt states no issues with XR formulation      Current Outpatient Medications on File Prior to Visit   Medication Sig Dispense Refill    cyanocobalamin (Vitamin B-12) 1,000 mcg tablet Take 1 tablet (1,000 mcg) by mouth once daily.      DULoxetine (Cymbalta) 60 mg DR capsule TAKE 1 CAPSULE DAILY 90 capsule 3    folic acid/multivit-min/lutein (CENTRUM SILVER ORAL) Take 1 tablet by mouth once daily.      lisinopril 20 mg tablet TAKE 1 TABLET DAILY 90 tablet 1    metFORMIN XR (Glucophage-XR) 500 mg 24 hr tablet Take 2 tablets by mouth twice daily 360 tablet 1    metoprolol tartrate (Lopressor) 25 mg tablet TAKE 2 TABLETS TWICE A DAY WITH FOOD 360 tablet 3    nitroglycerin (Nitrostat) 0.4 mg SL tablet Instructions: PLACE 1 TABLET UNDER THE TONGUE AS NEEDED FOR CHEST PAIN. MAY REPEAT EVERY FIVE MINUTES X 3.      OneTouch Verio test strips strip USE TO TEST SUGAR THREE TIMES A  strip 3    rosuvastatin (Crestor) 20 mg tablet TAKE 1 TABLET DAILY 90 tablet 3    [DISCONTINUED] apixaban (Eliquis) 5 mg tablet TAKE 1 TABLET BY MOUTH TWO TIMES A  tablet 3    [DISCONTINUED] empagliflozin (Jardiance) 25 mg Take 1 tablet (25 mg) by mouth once  daily. Hold dose if not eating or drinking normally. 30 tablet 2     No current facility-administered medications on file prior to visit.        Patient Assistance Screening (VAF)  Patient verbally reports monthly or yearly income which is less than 400% federal poverty level.  Application for program has been submitted for the following medications:     Eliquis, Jardiance    Patient has been informed that program team will be reaching out to them to discuss necessary documentation, instructed to answer phone/return voicemail.   Patient aware this process may take up to 6 weeks.   If approved medication must be filled through [facility] pharmacy and may be picked up or mailed to patient.       LABS  LMP  (LMP Unknown)    Lab Results   Component Value Date    HGBA1C 8.6 (H) 01/23/2025    HGBA1C 10.7 (H) 10/17/2024    HGBA1C 11.5 (H) 08/13/2024     Lab Results   Component Value Date    BILITOT 0.5 01/23/2025    CALCIUM 9.3 01/23/2025    CO2 28 01/23/2025     01/23/2025    CREATININE 0.77 01/23/2025    GLUCOSE 191 (H) 01/23/2025    ALKPHOS 63 01/23/2025    K 5.0 01/23/2025    PROT 6.2 (L) 01/23/2025     01/23/2025    AST 16 01/23/2025    ALT 22 01/23/2025    BUN 17 01/23/2025    ANIONGAP 13 01/23/2025    MG 2.0 06/30/2023    ALBUMIN 4.1 01/23/2025    LIPASE 27 06/02/2021     Lab Results   Component Value Date    TRIG 211 (H) 08/13/2024    CHOL 112 (L) 08/13/2024    LDLCALC 21 (L) 08/13/2024    HDL 49.0 (L) 08/13/2024         Assessment/Plan    Comments/Recommendations to PCP:  Reconciled medications with patient via telephone today.  Pt has been using Jardiance for T2DM and Eliquis for Afib.  Both medications have a large cost but are working well with no SE reported.    Reviewed instructions, MOA, SE and dose for Eliquis, Jardiance   Patient verbalizes understanding regarding plan of care and all questions answered   4.   Pt will follow up with PCP as scheduled          Sybil Smith [facility] LALIT    Verbal  consent to manage patient's drug therapy was obtained from the patient and/or an individual authorized to act on behalf of a patient. They were informed they may decline to participate or withdraw from participation in pharmacy services at any time.

## 2025-01-28 ENCOUNTER — PHARMACY VISIT (OUTPATIENT)
Dept: PHARMACY | Facility: CLINIC | Age: 75
End: 2025-01-28
Payer: COMMERCIAL

## 2025-01-30 DIAGNOSIS — E11.65 TYPE 2 DIABETES MELLITUS WITH HYPERGLYCEMIA, WITHOUT LONG-TERM CURRENT USE OF INSULIN: ICD-10-CM

## 2025-01-30 DIAGNOSIS — I48.91 ATRIAL FIBRILLATION WITH RVR (MULTI): Primary | ICD-10-CM

## 2025-01-31 PROCEDURE — RXMED WILLOW AMBULATORY MEDICATION CHARGE

## 2025-02-01 ENCOUNTER — PHARMACY VISIT (OUTPATIENT)
Dept: PHARMACY | Facility: CLINIC | Age: 75
End: 2025-02-01
Payer: COMMERCIAL

## 2025-02-14 NOTE — PROGRESS NOTES
DM FOLLOW UP  E11.9    Kristi Jackson presents to the office for her DM review. Referring Provider: Lacy Gil MD     HPI  Pt has a history of poorly controlled T2DM.  Last 3 A1c checks have been >9%.        CURRENT DM PHARMACOTHERAPY   Metformin 500mg stopped taking d/t SE/diarrhea   Pt is currently not taking any medication for T2DM    Reviewed all medications by prescribing practitioner (such as prescriptions, OTCs, herbal therapies and supplements) and documented in the medical record.         Primary/Secondary Prevention   - Statin? Yes  - ACE-I/ARB? Yes  - Aspirin? No    Summary of CGM Findings:  Patient is using: glucometer  The patient is not currently checking the blood glucose    Last Labs/Vitals/Meds  POC HEMOGLOBIN A1c (%)   Date Value   07/19/2024 10.5 (A)   04/03/2024 9.1 (A)   01/03/2024 7.5 (A)     Hemoglobin A1C (%)   Date Value   10/17/2024 10.7 (H)   08/13/2024 11.5 (H)     Glucose (mg/dL)   Date Value   10/17/2024 294 (H)     Creatinine (mg/dL)   Date Value   10/17/2024 0.60     eGFR (mL/min/1.73m*2)   Date Value   10/17/2024 >90       BP Readings from Last 6 Encounters:   08/12/24 98/60   07/19/24 118/72   06/05/24 134/73   05/31/24 140/78   05/25/24 135/74   04/03/24 104/62        Wt Readings from Last 6 Encounters:   09/16/24 87.1 kg (192 lb)   08/12/24 86.2 kg (190 lb)   07/19/24 88 kg (194 lb)   05/31/24 88 kg (194 lb)   05/25/24 89.5 kg (197 lb 5 oz)   04/24/24 89.4 kg (197 lb)       Current Outpatient Medications on File Prior to Visit   Medication Sig Dispense Refill    acetaminophen (Tylenol 8 HOUR) 650 mg ER tablet Take 1 tablet (650 mg) by mouth every 8 hours if needed for mild pain (1 - 3). Do not crush, chew, or split.      apixaban (Eliquis) 5 mg tablet TAKE 1 TABLET BY MOUTH TWO TIMES A  tablet 3    clopidogrel (Plavix) 75 mg tablet TAKE 1 TABLET DAILY 90 tablet 3    DULoxetine (Cymbalta) 60 mg DR capsule TAKE 1 CAPSULE DAILY 90 capsule 3    folic  acid/multivit-min/lutein (CENTRUM SILVER ORAL) Take 1 tablet by mouth once daily.      lisinopril 20 mg tablet TAKE 1 TABLET DAILY 90 tablet 1    metFORMIN (Glucophage) 1,000 mg tablet TAKE 1 TABLET TWICE A  tablet 3    metoprolol tartrate (Lopressor) 25 mg tablet TAKE 2 TABLETS TWICE A DAY WITH FOOD 360 tablet 3    nitroglycerin (Nitrostat) 0.4 mg SL tablet Instructions: PLACE 1 TABLET UNDER THE TONGUE AS NEEDED FOR CHEST PAIN. MAY REPEAT EVERY FIVE MINUTES X 3.      OneTouch Verio test strips strip USE TO TEST SUGAR THREE TIMES A  strip 3    rosuvastatin (Crestor) 10 mg tablet Take 1 tablet (10 mg) by mouth once daily at bedtime.      rosuvastatin (Crestor) 20 mg tablet TAKE 1 TABLET DAILY 90 tablet 3     No current facility-administered medications on file prior to visit.       Lifestyle:  Current diet: High in CHO  Exercise: no regular exercise    Assessment/Plan:   Discussed Metformin and SE/diarrhea.  Suggest start extended release formulation to minimize SE.  Pt is agreeable.   Discussed start 2nd agent d/t current A1c >10%.  Pt is hesitant but agreeable.    3.  Farxiga Education:  - Counseled patient on Farxiga MOA, expectations, side effects, duration of therapy, administration, and monitoring parameters.  - Reviewed the benefits of SGLT-2i therapy, such as glycemic control and kidney and CV protection.  - Advised patient to practice proper  hygiene to reduce risk of UTIs or yeast infections.  - Advised patient to maintain adequate fluid intake to remain hydrated while on SGLT2i therapy.  - Answered all patient questions and concerns.        Plan:   START Metformin XR 500mg.  Take 1 tablet twice daily for 7 days then increase to 2 tablets twice daily   START Farxiga 10mg.  Take 1 tablet once daily.  Follow up with Sybil in 1 month  Continue all meds under the continuation of care with the referring provider and clinical pharmacy team.    Data reviewed and evaluated by ENRIQUE Smith RPh,  CDCES  Treatment and plan changes discussed with Lacy Gil MD   (1) body pink, extremities blue

## 2025-03-05 DIAGNOSIS — I10 ESSENTIAL (PRIMARY) HYPERTENSION: ICD-10-CM

## 2025-03-05 RX ORDER — LISINOPRIL 20 MG/1
20 TABLET ORAL DAILY
Qty: 90 TABLET | Refills: 3 | Status: SHIPPED | OUTPATIENT
Start: 2025-03-05

## 2025-03-22 PROCEDURE — RXMED WILLOW AMBULATORY MEDICATION CHARGE

## 2025-03-27 ENCOUNTER — PHARMACY VISIT (OUTPATIENT)
Dept: PHARMACY | Facility: CLINIC | Age: 75
End: 2025-03-27
Payer: COMMERCIAL

## 2025-04-21 ENCOUNTER — APPOINTMENT (OUTPATIENT)
Dept: PRIMARY CARE | Facility: CLINIC | Age: 75
End: 2025-04-21
Payer: COMMERCIAL

## 2025-04-23 ENCOUNTER — APPOINTMENT (OUTPATIENT)
Dept: CARDIOLOGY | Facility: CLINIC | Age: 75
End: 2025-04-23
Payer: COMMERCIAL

## 2025-05-17 PROCEDURE — RXMED WILLOW AMBULATORY MEDICATION CHARGE

## 2025-05-19 ENCOUNTER — PHARMACY VISIT (OUTPATIENT)
Dept: PHARMACY | Facility: CLINIC | Age: 75
End: 2025-05-19
Payer: COMMERCIAL

## 2025-05-23 DIAGNOSIS — F41.9 ANXIETY: ICD-10-CM

## 2025-05-28 ENCOUNTER — TELEPHONE (OUTPATIENT)
Dept: PRIMARY CARE | Facility: CLINIC | Age: 75
End: 2025-05-28
Payer: COMMERCIAL

## 2025-05-28 DIAGNOSIS — E11.65 TYPE 2 DIABETES MELLITUS WITH HYPERGLYCEMIA, WITHOUT LONG-TERM CURRENT USE OF INSULIN: ICD-10-CM

## 2025-05-28 RX ORDER — DULOXETIN HYDROCHLORIDE 60 MG/1
60 CAPSULE, DELAYED RELEASE ORAL DAILY
Qty: 90 CAPSULE | Refills: 0 | Status: SHIPPED | OUTPATIENT
Start: 2025-05-28

## 2025-06-01 DIAGNOSIS — E11.65 TYPE 2 DIABETES MELLITUS WITH HYPERGLYCEMIA, WITHOUT LONG-TERM CURRENT USE OF INSULIN: ICD-10-CM

## 2025-06-11 LAB
ALBUMIN SERPL-MCNC: 3.9 G/DL (ref 3.6–5.1)
ALBUMIN/CREAT UR: 19 MG/G CREAT
ALP SERPL-CCNC: 74 U/L (ref 37–153)
ALT SERPL-CCNC: 16 U/L (ref 6–29)
ANION GAP SERPL CALCULATED.4IONS-SCNC: 10 MMOL/L (CALC) (ref 7–17)
APPEARANCE UR: ABNORMAL
AST SERPL-CCNC: 14 U/L (ref 10–35)
BACTERIA #/AREA URNS HPF: ABNORMAL /HPF
BILIRUB SERPL-MCNC: 0.7 MG/DL (ref 0.2–1.2)
BILIRUB UR QL STRIP: NEGATIVE
BUN SERPL-MCNC: 19 MG/DL (ref 7–25)
CALCIUM SERPL-MCNC: 9.1 MG/DL (ref 8.6–10.4)
CHLORIDE SERPL-SCNC: 98 MMOL/L (ref 98–110)
CO2 SERPL-SCNC: 29 MMOL/L (ref 20–32)
COLOR UR: YELLOW
CREAT SERPL-MCNC: 0.65 MG/DL (ref 0.6–1)
CREAT UR-MCNC: 52 MG/DL (ref 20–275)
EGFRCR SERPLBLD CKD-EPI 2021: 92 ML/MIN/1.73M2
EST. AVERAGE GLUCOSE BLD GHB EST-MCNC: 278 MG/DL
EST. AVERAGE GLUCOSE BLD GHB EST-SCNC: 15.4 MMOL/L
GLUCOSE SERPL-MCNC: 306 MG/DL (ref 65–99)
GLUCOSE UR QL STRIP: ABNORMAL
HBA1C MFR BLD: 11.3 %
HGB UR QL STRIP: NEGATIVE
HYALINE CASTS #/AREA URNS LPF: ABNORMAL /LPF
KETONES UR QL STRIP: ABNORMAL
LEUKOCYTE ESTERASE UR QL STRIP: ABNORMAL
MICROALBUMIN UR-MCNC: 1 MG/DL
NITRITE UR QL STRIP: NEGATIVE
PH UR STRIP: ABNORMAL [PH] (ref 5–8)
POTASSIUM SERPL-SCNC: 5 MMOL/L (ref 3.5–5.3)
PROT SERPL-MCNC: 6.1 G/DL (ref 6.1–8.1)
PROT UR QL STRIP: NEGATIVE
RBC #/AREA URNS HPF: ABNORMAL /HPF
SERVICE CMNT-IMP: ABNORMAL
SODIUM SERPL-SCNC: 137 MMOL/L (ref 135–146)
SP GR UR STRIP: 1.02 (ref 1–1.03)
SQUAMOUS #/AREA URNS HPF: ABNORMAL /HPF
WBC #/AREA URNS HPF: ABNORMAL /HPF

## 2025-06-13 ENCOUNTER — APPOINTMENT (OUTPATIENT)
Dept: PRIMARY CARE | Facility: CLINIC | Age: 75
End: 2025-06-13
Payer: COMMERCIAL

## 2025-06-20 DIAGNOSIS — R73.01 IMPAIRED FASTING GLUCOSE: ICD-10-CM

## 2025-06-20 RX ORDER — METFORMIN HYDROCHLORIDE 500 MG/1
TABLET, EXTENDED RELEASE ORAL
Qty: 360 TABLET | Refills: 1 | Status: CANCELLED | OUTPATIENT
Start: 2025-06-20

## 2025-06-20 RX ORDER — METFORMIN HYDROCHLORIDE 500 MG/1
TABLET, EXTENDED RELEASE ORAL
Qty: 360 TABLET | Refills: 0 | Status: SHIPPED | OUTPATIENT
Start: 2025-06-20

## 2025-06-21 PROCEDURE — RXMED WILLOW AMBULATORY MEDICATION CHARGE

## 2025-06-23 ENCOUNTER — TELEPHONE (OUTPATIENT)
Dept: PRIMARY CARE | Facility: CLINIC | Age: 75
End: 2025-06-23
Payer: COMMERCIAL

## 2025-06-25 ENCOUNTER — PHARMACY VISIT (OUTPATIENT)
Dept: PHARMACY | Facility: CLINIC | Age: 75
End: 2025-06-25
Payer: COMMERCIAL

## 2025-07-29 ENCOUNTER — APPOINTMENT (OUTPATIENT)
Dept: PRIMARY CARE | Facility: CLINIC | Age: 75
End: 2025-07-29
Payer: COMMERCIAL

## 2025-07-29 ENCOUNTER — TELEPHONE (OUTPATIENT)
Dept: PRIMARY CARE | Facility: CLINIC | Age: 75
End: 2025-07-29

## 2025-07-29 VITALS
BODY MASS INDEX: 31.76 KG/M2 | HEIGHT: 64 IN | OXYGEN SATURATION: 97 % | DIASTOLIC BLOOD PRESSURE: 62 MMHG | WEIGHT: 186 LBS | SYSTOLIC BLOOD PRESSURE: 118 MMHG | TEMPERATURE: 97.4 F | HEART RATE: 97 BPM

## 2025-07-29 DIAGNOSIS — I10 ESSENTIAL (PRIMARY) HYPERTENSION: ICD-10-CM

## 2025-07-29 DIAGNOSIS — E11.65 TYPE 2 DIABETES MELLITUS WITH HYPERGLYCEMIA, WITHOUT LONG-TERM CURRENT USE OF INSULIN: ICD-10-CM

## 2025-07-29 DIAGNOSIS — R39.9 UTI SYMPTOMS: ICD-10-CM

## 2025-07-29 DIAGNOSIS — E11.9 TYPE 2 DIABETES MELLITUS WITHOUT COMPLICATION, WITHOUT LONG-TERM CURRENT USE OF INSULIN: Primary | ICD-10-CM

## 2025-07-29 LAB
POC APPEARANCE, URINE: CLEAR
POC BILIRUBIN, URINE: NEGATIVE
POC BLOOD, URINE: ABNORMAL
POC COLOR, URINE: YELLOW
POC GLUCOSE, URINE: ABNORMAL MG/DL
POC KETONES, URINE: NEGATIVE MG/DL
POC LEUKOCYTES, URINE: NEGATIVE
POC NITRITE,URINE: POSITIVE
POC PH, URINE: 5.5 PH
POC PROTEIN, URINE: ABNORMAL MG/DL
POC SPECIFIC GRAVITY, URINE: 1.01
POC UROBILINOGEN, URINE: 0.2 EU/DL

## 2025-07-29 PROCEDURE — 81003 URINALYSIS AUTO W/O SCOPE: CPT | Performed by: STUDENT IN AN ORGANIZED HEALTH CARE EDUCATION/TRAINING PROGRAM

## 2025-07-29 PROCEDURE — 3008F BODY MASS INDEX DOCD: CPT | Performed by: STUDENT IN AN ORGANIZED HEALTH CARE EDUCATION/TRAINING PROGRAM

## 2025-07-29 PROCEDURE — 99214 OFFICE O/P EST MOD 30 MIN: CPT | Performed by: STUDENT IN AN ORGANIZED HEALTH CARE EDUCATION/TRAINING PROGRAM

## 2025-07-29 PROCEDURE — 1159F MED LIST DOCD IN RCRD: CPT | Performed by: STUDENT IN AN ORGANIZED HEALTH CARE EDUCATION/TRAINING PROGRAM

## 2025-07-29 PROCEDURE — 4010F ACE/ARB THERAPY RXD/TAKEN: CPT | Performed by: STUDENT IN AN ORGANIZED HEALTH CARE EDUCATION/TRAINING PROGRAM

## 2025-07-29 PROCEDURE — 3078F DIAST BP <80 MM HG: CPT | Performed by: STUDENT IN AN ORGANIZED HEALTH CARE EDUCATION/TRAINING PROGRAM

## 2025-07-29 PROCEDURE — 1126F AMNT PAIN NOTED NONE PRSNT: CPT | Performed by: STUDENT IN AN ORGANIZED HEALTH CARE EDUCATION/TRAINING PROGRAM

## 2025-07-29 PROCEDURE — 3074F SYST BP LT 130 MM HG: CPT | Performed by: STUDENT IN AN ORGANIZED HEALTH CARE EDUCATION/TRAINING PROGRAM

## 2025-07-29 ASSESSMENT — PAIN SCALES - GENERAL: PAINLEVEL_OUTOF10: 0-NO PAIN

## 2025-07-31 ENCOUNTER — CLINICAL SUPPORT (OUTPATIENT)
Dept: PRIMARY CARE | Facility: CLINIC | Age: 75
End: 2025-07-31
Payer: COMMERCIAL

## 2025-07-31 DIAGNOSIS — E11.65 TYPE 2 DIABETES MELLITUS WITH HYPERGLYCEMIA, WITHOUT LONG-TERM CURRENT USE OF INSULIN: Primary | ICD-10-CM

## 2025-07-31 DIAGNOSIS — E11.65 INADEQUATELY CONTROLLED DIABETES MELLITUS (MULTI): ICD-10-CM

## 2025-07-31 LAB — BACTERIA UR CULT: ABNORMAL

## 2025-07-31 PROCEDURE — RXMED WILLOW AMBULATORY MEDICATION CHARGE

## 2025-07-31 NOTE — PROGRESS NOTES
"DM FOLLOW UP  E11.9 - Type 2 diabetes    Kristi Jackson is a 74 y.o. female here today at the request of Referring Provider: Lacy Gil MD for my opinion regarding diabetes management.  My final recommendations will be communicated back to the requesting provider by way of shared medical record.     Patient here today to discuss recent A1c of 11.3%.  Pt recently stopped taking her metformin and jardiance \"for a few weeks\" because she did not believe they were necessary.  As a result, A1c increased.  Pt restarted medications after last OV with PCP.      Patient is approved for VAF     Subjective   Past Medical History:  She has a past medical history of ASHD (arteriosclerotic heart disease), Atrial fibrillation (Multi), and Contusion of hand (07/02/2023).    Social History:  She reports that she has never smoked. She has never used smokeless tobacco. She reports that she does not currently use alcohol. She reports that she does not use drugs.    Allergies:  Metformin    CURRENT PHARMACOTHERAPY   Current Outpatient Medications   Medication Instructions    apixaban (Eliquis) 5 mg tablet TAKE 1 TABLET BY MOUTH TWO TIMES A DAY    cyanocobalamin (VITAMIN B-12) 1,000 mcg, Daily    DULoxetine (CYMBALTA) 60 mg, oral, Daily    folic acid/multivit-min/lutein (CENTRUM SILVER ORAL) 1 tablet, Daily    Jardiance 25 mg, oral, Daily, Hold dose if not eating or drinking normally.    lisinopril 20 mg, oral, Daily    metFORMIN XR (Glucophage-XR) 500 mg 24 hr tablet Take 2 tablets by mouth twice daily    metoprolol tartrate (LOPRESSOR) 50 mg, oral, 2 times daily (morning and late afternoon)    nitroglycerin (Nitrostat) 0.4 mg SL tablet Instructions: PLACE 1 TABLET UNDER THE TONGUE AS NEEDED FOR CHEST PAIN. MAY REPEAT EVERY FIVE MINUTES X 3.    OneTouch Verio test strips strip USE TO TEST SUGAR THREE TIMES A DAY    rosuvastatin (CRESTOR) 20 mg, oral, Daily     Pt denies SE/intolerances  Reviewed all medications by prescribing " practitioner (such as prescriptions, OTCs, herbal therapies, supplements) and documented in the medical record.     Reviewed need for secondary prevention: Statins, ACE-I/ARB, Aspirin    Glucose Monitoring  Patient is checking glucose intermittently.  Recent FBS: 160's-170's    Lifestyle:  Current diet: improving, trying to limit CHO foods  Current exercise: 30 minutes 1-2 days per week    Last Labs/Vitals/Meds  POC HEMOGLOBIN A1c (%)   Date Value   07/19/2024 10.5 (A)   04/03/2024 9.1 (A)   01/03/2024 7.5 (A)     HEMOGLOBIN A1c (%)   Date Value   06/10/2025 11.3 (H)     Hemoglobin A1C (%)   Date Value   01/23/2025 8.6 (H)   10/17/2024 10.7 (H)   08/13/2024 11.5 (H)   09/06/2023 7.1 (H)   06/29/2023 5.9   06/02/2023 5.9   03/31/2023 6.5 (H)   12/08/2022 7.5 (H)   09/15/2022 7.1 (H)     GLUCOSE (mg/dL)   Date Value   06/10/2025 306 (H)     CREATININE (mg/dL)   Date Value   06/10/2025 0.65     EGFR (mL/min/1.73m2)   Date Value   06/10/2025 92       BP Readings from Last 3 Encounters:   07/29/25 118/62   01/27/25 120/76   10/23/24 130/64        Wt Readings from Last 6 Encounters:   07/29/25 84.4 kg (186 lb)   01/27/25 87.5 kg (193 lb)   10/23/24 85.7 kg (189 lb)   10/21/24 87.1 kg (192 lb)   09/16/24 87.1 kg (192 lb)   08/12/24 86.2 kg (190 lb)     BMI Readings from Last 6 Encounters:   07/29/25 31.93 kg/m²   01/27/25 35.30 kg/m²   10/23/24 34.57 kg/m²   10/21/24 35.12 kg/m²   09/16/24 35.12 kg/m²   08/12/24 32.61 kg/m²       Assessment:  Patients diabetes is needs improvement with most recent A1c of 11.3% (Goal < 7%).    Compliance at present is estimated to be fair  Discussed start GLP-1 to improve glycemic control and reduce weight.  Pt does not wish to start injectable medication because she is worried about it being kept in the fridge but is open to starting Rybelsus.    4.   Semaglutide Education:   - Counseled patient on Ozempic/Wegovy/Rybelsus MOA, expectations, side effects, duration of therapy and monitoring  parameters.  - Provided detailed dosing and administration counseling to ensure proper technique.   - Reviewed Semaglutide titration schedule, starting with 3 mg daily.   - Counseled patient on the benefits of GLP-1ra, such as cardiovascular risk reduction, glycemic control, and weight loss potential.  - Advised patient that they may experience improved satiety after meals and portion sizes of meals may be reduced as doses of Semaglutide increase.  - Counseled patient to avoid foods that are fatty/oily as this may precipitate the nausea/GI upset that may occur with new start Semaglutide.       Plan:  1.  START Rybelsus 3mg once daily at least 30 minutes prior to eating/drinking  2.  Continue all other medications at current dosages  3.  Follow up in 1 month with clinical pharmacist      Data reviewed and evaluated by ENRIQUE Smith RPH, LALIT  Treatment and plan changes discussed with Lacy Gil MD

## 2025-08-04 ENCOUNTER — PHARMACY VISIT (OUTPATIENT)
Dept: PHARMACY | Facility: CLINIC | Age: 75
End: 2025-08-04
Payer: COMMERCIAL

## 2025-08-07 ENCOUNTER — TELEPHONE (OUTPATIENT)
Dept: PRIMARY CARE | Facility: CLINIC | Age: 75
End: 2025-08-07
Payer: COMMERCIAL

## 2025-08-07 DIAGNOSIS — N30.00 ACUTE CYSTITIS WITHOUT HEMATURIA: Primary | ICD-10-CM

## 2025-08-07 NOTE — TELEPHONE ENCOUNTER
Pt would like more macrobid called in , said  the uti is going away but still has some sx. She thinks another round should do it. Please advise     [facility]

## 2025-08-08 RX ORDER — SULFAMETHOXAZOLE AND TRIMETHOPRIM 800; 160 MG/1; MG/1
1 TABLET ORAL 2 TIMES DAILY
Qty: 6 TABLET | Refills: 0 | Status: SHIPPED | OUTPATIENT
Start: 2025-08-08 | End: 2025-08-11

## 2025-08-08 NOTE — TELEPHONE ENCOUNTER
If symptoms not completely resolved after 5 days of Macrobid then we should treat with a different antibiotic. Would recommend Bactrim x 3 days. If she is agreeable I will send rx.

## 2025-08-21 DIAGNOSIS — F41.9 ANXIETY: ICD-10-CM

## 2025-08-21 RX ORDER — DULOXETIN HYDROCHLORIDE 60 MG/1
60 CAPSULE, DELAYED RELEASE ORAL DAILY
Qty: 90 CAPSULE | Refills: 0 | Status: SHIPPED | OUTPATIENT
Start: 2025-08-21

## 2025-08-23 PROCEDURE — RXMED WILLOW AMBULATORY MEDICATION CHARGE

## 2025-08-25 ENCOUNTER — PHARMACY VISIT (OUTPATIENT)
Dept: PHARMACY | Facility: CLINIC | Age: 75
End: 2025-08-25
Payer: COMMERCIAL

## 2025-08-27 DIAGNOSIS — I25.10 ASHD (ARTERIOSCLEROTIC HEART DISEASE): ICD-10-CM

## 2025-08-28 RX ORDER — METOPROLOL TARTRATE 25 MG/1
50 TABLET, FILM COATED ORAL
Qty: 360 TABLET | Refills: 3 | Status: SHIPPED | OUTPATIENT
Start: 2025-08-28

## 2025-08-28 RX ORDER — ROSUVASTATIN CALCIUM 20 MG/1
20 TABLET, COATED ORAL DAILY
Qty: 90 TABLET | Refills: 3 | Status: SHIPPED | OUTPATIENT
Start: 2025-08-28

## 2025-09-02 PROCEDURE — RXMED WILLOW AMBULATORY MEDICATION CHARGE

## 2025-09-04 ENCOUNTER — PHARMACY VISIT (OUTPATIENT)
Dept: PHARMACY | Facility: CLINIC | Age: 75
End: 2025-09-04
Payer: COMMERCIAL

## 2025-09-11 ENCOUNTER — APPOINTMENT (OUTPATIENT)
Dept: PRIMARY CARE | Facility: CLINIC | Age: 75
End: 2025-09-11
Payer: COMMERCIAL

## 2025-10-31 ENCOUNTER — APPOINTMENT (OUTPATIENT)
Dept: PRIMARY CARE | Facility: CLINIC | Age: 75
End: 2025-10-31
Payer: COMMERCIAL

## (undated) DEVICE — NEEDLE, HYPODERMIC, MONOJECT, 25 G X 1.5 IN, LUER LOCK HUB, RED

## (undated) DEVICE — Device

## (undated) DEVICE — GLOVE, SURGICAL, PROTEXIS PI BLUE W/NEUTHERA, 7.5, PF, LF

## (undated) DEVICE — BANDAGE, COBAN 2, LAYER LITE COMPRESSION, 4 IN, LF

## (undated) DEVICE — SUTURE, MONOCRYL, 3-0, 27 IN, PS-2, UNDYED

## (undated) DEVICE — ADHESIVE, SKIN, DERMABOND ADVANCED, 15CM, PEN-STYLE

## (undated) DEVICE — CAUTERY, PENCIL, PUSH BUTTON, SMOKE EVAC, 70MM

## (undated) DEVICE — SOLUTION, IRRIGATION, X RX SODIUM CHL 0.9%, 1000ML BTL

## (undated) DEVICE — SUTURE, VICRYL, 0, 36 IN, CT-1, UNDYED

## (undated) DEVICE — RETRIEVER, SUTURE, HEWSON

## (undated) DEVICE — SYRINGE, 10 CC, LUER LOCK

## (undated) DEVICE — DRAPE, SHEET, LARGE, 70 X 85IN, STERILE

## (undated) DEVICE — SLING, ARM, MEDIUM

## (undated) DEVICE — SUTURE, VICRYL, 2-0, 36 IN, CT-1, UNDYED

## (undated) DEVICE — PADDING, UNDERCAST, WEBRIL, 4 IN X 4 YD, REG, NS

## (undated) DEVICE — BANDAGE, FLEX-WRAP, 4 IN X 5 YD, TAN, STERILE

## (undated) DEVICE — FIBERTAPE, 2MM X 7, FIBERWIRE, BLUE

## (undated) DEVICE — STOCKINETTE, IMPERVIOUS, 12 X 48 IN, LF, STERILE

## (undated) DEVICE — SPONGE, LAP, X-RAY, RF DETECT, 18 X 18IN, STERILE

## (undated) DEVICE — BANDAGE, ESMARK, 4 IN X 12 FT, LF

## (undated) DEVICE — SPONGE, GAUZE, AVANT, STERILE, NONWOVEN, 4PLY, 4 X 4, STANDARD

## (undated) DEVICE — TIP, SUCTION, YANKAUER, FLEXIBLE

## (undated) DEVICE — GOWN, SURGICAL, SIRUS, NON REINFORCED, LARGE

## (undated) DEVICE — DRAPE, SHEET, U, W/ADHESIVE STRIP, IMPERVIOUS, 60 X 70 IN, DISPOSABLE, LF, STERILE

## (undated) DEVICE — APPLICATOR, CHLORAPREP, W/ORANGE TINT, 26ML

## (undated) DEVICE — BIT, DRILL, CANNULATED, QUICK CONNECT, 5.0 X 200MM

## (undated) DEVICE — DRESSING, NON-ADHERENT, CURAD, ABSORBENT, 3 X 8 IN, STERILE

## (undated) DEVICE — DRAPE, C-ARM IMAGE